# Patient Record
Sex: FEMALE | URBAN - METROPOLITAN AREA
[De-identification: names, ages, dates, MRNs, and addresses within clinical notes are randomized per-mention and may not be internally consistent; named-entity substitution may affect disease eponyms.]

---

## 2017-12-14 ENCOUNTER — INPATIENT (INPATIENT)
Facility: HOSPITAL | Age: 77
LOS: 1 days | Discharge: HOME | End: 2017-12-16
Attending: INTERNAL MEDICINE | Admitting: INTERNAL MEDICINE
Payer: MEDICARE

## 2017-12-14 VITALS
DIASTOLIC BLOOD PRESSURE: 92 MMHG | OXYGEN SATURATION: 99 % | RESPIRATION RATE: 18 BRPM | HEART RATE: 93 BPM | TEMPERATURE: 98 F | SYSTOLIC BLOOD PRESSURE: 187 MMHG

## 2017-12-14 DIAGNOSIS — J45.909 UNSPECIFIED ASTHMA, UNCOMPLICATED: ICD-10-CM

## 2017-12-14 LAB
ALBUMIN SERPL ELPH-MCNC: 4 G/DL — SIGNIFICANT CHANGE UP (ref 3.3–5)
ALP SERPL-CCNC: 81 U/L — SIGNIFICANT CHANGE UP (ref 40–120)
ALT FLD-CCNC: 10 U/L — SIGNIFICANT CHANGE UP (ref 4–33)
AST SERPL-CCNC: 13 U/L — SIGNIFICANT CHANGE UP (ref 4–32)
B PERT DNA SPEC QL NAA+PROBE: SIGNIFICANT CHANGE UP
BASE EXCESS BLDV CALC-SCNC: 4.5 MMOL/L — SIGNIFICANT CHANGE UP
BASOPHILS # BLD AUTO: 0.02 K/UL — SIGNIFICANT CHANGE UP (ref 0–0.2)
BASOPHILS NFR BLD AUTO: 0.2 % — SIGNIFICANT CHANGE UP (ref 0–2)
BILIRUB SERPL-MCNC: 0.6 MG/DL — SIGNIFICANT CHANGE UP (ref 0.2–1.2)
BLOOD GAS VENOUS - CREATININE: 0.63 MG/DL — SIGNIFICANT CHANGE UP (ref 0.5–1.3)
BUN SERPL-MCNC: 12 MG/DL — SIGNIFICANT CHANGE UP (ref 7–23)
C PNEUM DNA SPEC QL NAA+PROBE: NOT DETECTED — SIGNIFICANT CHANGE UP
CALCIUM SERPL-MCNC: 9.8 MG/DL — SIGNIFICANT CHANGE UP (ref 8.4–10.5)
CHLORIDE BLDV-SCNC: 101 MMOL/L — SIGNIFICANT CHANGE UP (ref 96–108)
CHLORIDE SERPL-SCNC: 99 MMOL/L — SIGNIFICANT CHANGE UP (ref 98–107)
CO2 SERPL-SCNC: 28 MMOL/L — SIGNIFICANT CHANGE UP (ref 22–31)
CREAT SERPL-MCNC: 0.75 MG/DL — SIGNIFICANT CHANGE UP (ref 0.5–1.3)
EOSINOPHIL # BLD AUTO: 0.03 K/UL — SIGNIFICANT CHANGE UP (ref 0–0.5)
EOSINOPHIL NFR BLD AUTO: 0.4 % — SIGNIFICANT CHANGE UP (ref 0–6)
FLUAV H1 2009 PAND RNA SPEC QL NAA+PROBE: NOT DETECTED — SIGNIFICANT CHANGE UP
FLUAV H1 RNA SPEC QL NAA+PROBE: NOT DETECTED — SIGNIFICANT CHANGE UP
FLUAV H3 RNA SPEC QL NAA+PROBE: NOT DETECTED — SIGNIFICANT CHANGE UP
FLUAV SUBTYP SPEC NAA+PROBE: SIGNIFICANT CHANGE UP
FLUBV RNA SPEC QL NAA+PROBE: NOT DETECTED — SIGNIFICANT CHANGE UP
GAS PNL BLDV: 137 MMOL/L — SIGNIFICANT CHANGE UP (ref 136–146)
GLUCOSE BLDV-MCNC: 113 — HIGH (ref 70–99)
GLUCOSE SERPL-MCNC: 114 MG/DL — HIGH (ref 70–99)
HADV DNA SPEC QL NAA+PROBE: NOT DETECTED — SIGNIFICANT CHANGE UP
HCO3 BLDV-SCNC: 26 MMOL/L — SIGNIFICANT CHANGE UP (ref 20–27)
HCOV 229E RNA SPEC QL NAA+PROBE: NOT DETECTED — SIGNIFICANT CHANGE UP
HCOV HKU1 RNA SPEC QL NAA+PROBE: NOT DETECTED — SIGNIFICANT CHANGE UP
HCOV NL63 RNA SPEC QL NAA+PROBE: NOT DETECTED — SIGNIFICANT CHANGE UP
HCOV OC43 RNA SPEC QL NAA+PROBE: NOT DETECTED — SIGNIFICANT CHANGE UP
HCT VFR BLD CALC: 42.5 % — SIGNIFICANT CHANGE UP (ref 34.5–45)
HCT VFR BLDV CALC: 42.5 % — SIGNIFICANT CHANGE UP (ref 34.5–45)
HGB BLD-MCNC: 13.4 G/DL — SIGNIFICANT CHANGE UP (ref 11.5–15.5)
HGB BLDV-MCNC: 13.8 G/DL — SIGNIFICANT CHANGE UP (ref 11.5–15.5)
HMPV RNA SPEC QL NAA+PROBE: NOT DETECTED — SIGNIFICANT CHANGE UP
HPIV1 RNA SPEC QL NAA+PROBE: NOT DETECTED — SIGNIFICANT CHANGE UP
HPIV2 RNA SPEC QL NAA+PROBE: NOT DETECTED — SIGNIFICANT CHANGE UP
HPIV3 RNA SPEC QL NAA+PROBE: NOT DETECTED — SIGNIFICANT CHANGE UP
HPIV4 RNA SPEC QL NAA+PROBE: NOT DETECTED — SIGNIFICANT CHANGE UP
IMM GRANULOCYTES # BLD AUTO: 0.03 # — SIGNIFICANT CHANGE UP
IMM GRANULOCYTES NFR BLD AUTO: 0.4 % — SIGNIFICANT CHANGE UP (ref 0–1.5)
LACTATE BLDV-MCNC: 1.7 MMOL/L — SIGNIFICANT CHANGE UP (ref 0.5–2)
LYMPHOCYTES # BLD AUTO: 1.78 K/UL — SIGNIFICANT CHANGE UP (ref 1–3.3)
LYMPHOCYTES # BLD AUTO: 21.2 % — SIGNIFICANT CHANGE UP (ref 13–44)
M PNEUMO DNA SPEC QL NAA+PROBE: NOT DETECTED — SIGNIFICANT CHANGE UP
MCHC RBC-ENTMCNC: 24.9 PG — LOW (ref 27–34)
MCHC RBC-ENTMCNC: 31.5 % — LOW (ref 32–36)
MCV RBC AUTO: 78.8 FL — LOW (ref 80–100)
MONOCYTES # BLD AUTO: 0.46 K/UL — SIGNIFICANT CHANGE UP (ref 0–0.9)
MONOCYTES NFR BLD AUTO: 5.5 % — SIGNIFICANT CHANGE UP (ref 2–14)
NEUTROPHILS # BLD AUTO: 6.07 K/UL — SIGNIFICANT CHANGE UP (ref 1.8–7.4)
NEUTROPHILS NFR BLD AUTO: 72.3 % — SIGNIFICANT CHANGE UP (ref 43–77)
NRBC # FLD: 0 — SIGNIFICANT CHANGE UP
PCO2 BLDV: 61 MMHG — HIGH (ref 41–51)
PH BLDV: 7.32 PH — SIGNIFICANT CHANGE UP (ref 7.32–7.43)
PLATELET # BLD AUTO: 238 K/UL — SIGNIFICANT CHANGE UP (ref 150–400)
PMV BLD: 10 FL — SIGNIFICANT CHANGE UP (ref 7–13)
PO2 BLDV: 36 MMHG — SIGNIFICANT CHANGE UP (ref 35–40)
POTASSIUM BLDV-SCNC: 4.1 MMOL/L — SIGNIFICANT CHANGE UP (ref 3.4–4.5)
POTASSIUM SERPL-MCNC: 4.1 MMOL/L — SIGNIFICANT CHANGE UP (ref 3.5–5.3)
POTASSIUM SERPL-SCNC: 4.1 MMOL/L — SIGNIFICANT CHANGE UP (ref 3.5–5.3)
PROT SERPL-MCNC: 7.1 G/DL — SIGNIFICANT CHANGE UP (ref 6–8.3)
RBC # BLD: 5.39 M/UL — HIGH (ref 3.8–5.2)
RBC # FLD: 15.3 % — HIGH (ref 10.3–14.5)
RSV RNA SPEC QL NAA+PROBE: NOT DETECTED — SIGNIFICANT CHANGE UP
RV+EV RNA SPEC QL NAA+PROBE: NOT DETECTED — SIGNIFICANT CHANGE UP
SAO2 % BLDV: 58.9 % — LOW (ref 60–85)
SODIUM SERPL-SCNC: 139 MMOL/L — SIGNIFICANT CHANGE UP (ref 135–145)
WBC # BLD: 8.39 K/UL — SIGNIFICANT CHANGE UP (ref 3.8–10.5)
WBC # FLD AUTO: 8.39 K/UL — SIGNIFICANT CHANGE UP (ref 3.8–10.5)

## 2017-12-14 PROCEDURE — 99223 1ST HOSP IP/OBS HIGH 75: CPT

## 2017-12-14 PROCEDURE — 71020: CPT | Mod: 26

## 2017-12-14 PROCEDURE — 73130 X-RAY EXAM OF HAND: CPT | Mod: 26,LT

## 2017-12-14 PROCEDURE — 73090 X-RAY EXAM OF FOREARM: CPT | Mod: 26,LT

## 2017-12-14 PROCEDURE — 73110 X-RAY EXAM OF WRIST: CPT | Mod: 26,LT

## 2017-12-14 RX ORDER — MAGNESIUM SULFATE 500 MG/ML
2 VIAL (ML) INJECTION ONCE
Qty: 0 | Refills: 0 | Status: COMPLETED | OUTPATIENT
Start: 2017-12-14 | End: 2017-12-14

## 2017-12-14 RX ORDER — ALBUTEROL 90 UG/1
1 AEROSOL, METERED ORAL EVERY 4 HOURS
Qty: 0 | Refills: 0 | Status: DISCONTINUED | OUTPATIENT
Start: 2017-12-14 | End: 2017-12-16

## 2017-12-14 RX ORDER — HYDROCORTISONE 20 MG
125 TABLET ORAL ONCE
Qty: 0 | Refills: 0 | Status: DISCONTINUED | OUTPATIENT
Start: 2017-12-14 | End: 2017-12-14

## 2017-12-14 RX ORDER — ONDANSETRON 8 MG/1
4 TABLET, FILM COATED ORAL ONCE
Qty: 0 | Refills: 0 | Status: COMPLETED | OUTPATIENT
Start: 2017-12-14 | End: 2017-12-14

## 2017-12-14 RX ORDER — IPRATROPIUM/ALBUTEROL SULFATE 18-103MCG
3 AEROSOL WITH ADAPTER (GRAM) INHALATION ONCE
Qty: 0 | Refills: 0 | Status: COMPLETED | OUTPATIENT
Start: 2017-12-14 | End: 2017-12-14

## 2017-12-14 RX ORDER — IPRATROPIUM/ALBUTEROL SULFATE 18-103MCG
3 AEROSOL WITH ADAPTER (GRAM) INHALATION EVERY 6 HOURS
Qty: 0 | Refills: 0 | Status: DISCONTINUED | OUTPATIENT
Start: 2017-12-14 | End: 2017-12-16

## 2017-12-14 RX ORDER — TIOTROPIUM BROMIDE 18 UG/1
1 CAPSULE ORAL; RESPIRATORY (INHALATION) DAILY
Qty: 0 | Refills: 0 | Status: DISCONTINUED | OUTPATIENT
Start: 2017-12-14 | End: 2017-12-16

## 2017-12-14 RX ORDER — OXYCODONE AND ACETAMINOPHEN 5; 325 MG/1; MG/1
1 TABLET ORAL ONCE
Qty: 0 | Refills: 0 | Status: DISCONTINUED | OUTPATIENT
Start: 2017-12-14 | End: 2017-12-14

## 2017-12-14 RX ADMIN — Medication 3 MILLILITER(S): at 12:31

## 2017-12-14 RX ADMIN — OXYCODONE AND ACETAMINOPHEN 1 TABLET(S): 5; 325 TABLET ORAL at 12:15

## 2017-12-14 RX ADMIN — Medication 3 MILLILITER(S): at 12:00

## 2017-12-14 RX ADMIN — OXYCODONE AND ACETAMINOPHEN 1 TABLET(S): 5; 325 TABLET ORAL at 10:56

## 2017-12-14 RX ADMIN — ONDANSETRON 4 MILLIGRAM(S): 8 TABLET, FILM COATED ORAL at 10:56

## 2017-12-14 RX ADMIN — Medication 50 GRAM(S): at 14:40

## 2017-12-14 RX ADMIN — Medication 3 MILLILITER(S): at 10:57

## 2017-12-14 RX ADMIN — Medication 125 MILLIGRAM(S): at 10:56

## 2017-12-14 NOTE — ED PROVIDER NOTE - MEDICAL DECISION MAKING DETAILS
L wrist pain s/p FOOSH- likely distal radius +/- distal ulnar fx- will do pain control, xray  Worsening sob/wheezing 2/2 asthma- will do cxr, give duonebs, steroids, re-eval; will give pulm outpt f/u if pt is improved.

## 2017-12-14 NOTE — ED PROVIDER NOTE - ATTENDING CONTRIBUTION TO CARE
ED Attending (Alfredito REYES): I have personally performed a face to face bedside history and physical examination of this patient. I have discussed the history, examination, assessment and plan of management with the Physician Assistant. My findings include: 77 year old right-hand dominant female, PMHx of asthma (h/o ICU stays, +intubations); presents to the ED c/o left wrist pain s/p fall. Patient states she was walking backward yesterday, had a mechanical trip and fall backward. She fell onto outstretched left hand. Since then, the wrist has become progressively more painful, swollen and discolored, and she has been unable to move it prompting her to come to the ED. On exam: in pain, L wrist swollen ecchymotic, tender, limited ROM, neuro-vascular exam of L arm and hand normal, no other injuries noted, able to move fingers and feel light touch normally, lungs decreased air movement throughout, no resp distress. Imp likely fx L wrist, poorly controlled asthma. Plan: analgesia, Xrays, neb treatments, reassess

## 2017-12-14 NOTE — ED PROVIDER NOTE - PROGRESS NOTE DETAILS
Discussed wrist fracture with ortho who reviewed xrays and recommended sugar tongs splint and ortho follow up

## 2017-12-14 NOTE — ED PROVIDER NOTE - UPPER EXTREMITY EXAM, LEFT
JOINT SWELLING/TENDERNESS/LIMITED ROM/REDUCED STRENGTH/L wrist: Decreased ROM of wrist- FROM fingers/elbow. Diffuse ecchymosis over distal wrist. +mild deformity at distal wrist, with swelling. NVI.  Normal elbow/hand exam, no snuffbox TTP./BRUISING/SWELLING/DEFORMITY

## 2017-12-14 NOTE — ED PROVIDER NOTE - PMH
Asthma  Hospitalized January 2013 with active asthma. was intubated in MICU  Diverticulitis of colon without hemorrhage    HTN (hypertension)  was on cozaar & cardizem was stopped by MD as per pt  Low back pain

## 2017-12-14 NOTE — ED PROVIDER NOTE - OBJECTIVE STATEMENT
77 year old right-hand dominant female, PMHx of asthma (h/o ICU stays, +intubations); presents to the ED c/o left wrist pain s/p fall. Patient states she was walking backward yesterday, had a mechanical trip and fall backward. She fell onto outstretched left hand. Since then, the wrist has become progressively more painful, swollen and discolored, and she has been unable to move it prompting her to come to the ED. Of note, patient recently changed insurances and has not seen her pulmonologist in many months. She endorses worsening asthma symptoms of shortness of breath and wheezing especially in the last 1-2 weeks. She notes she has difficulty walking even short distances and needs to use her pump. The pump gives her mild relief of symptoms, but notes worsening SOB at rest. She denies fevers, chills, nausea, vomiting, productive cough, chest pain, numbness, tingling, head trauma, LOC or other complaints.

## 2017-12-14 NOTE — ED ADULT TRIAGE NOTE - CHIEF COMPLAINT QUOTE
Patient fell yesterday and hurt her left hand/wrist. Pt has swelling to site and discoloration. Site is very painful. Pt is also asthmatic and c/o difficulty with her breathing since then.

## 2017-12-15 ENCOUNTER — TRANSCRIPTION ENCOUNTER (OUTPATIENT)
Age: 77
End: 2017-12-15

## 2017-12-15 DIAGNOSIS — J45.51 SEVERE PERSISTENT ASTHMA WITH (ACUTE) EXACERBATION: ICD-10-CM

## 2017-12-15 DIAGNOSIS — Z29.9 ENCOUNTER FOR PROPHYLACTIC MEASURES, UNSPECIFIED: ICD-10-CM

## 2017-12-15 DIAGNOSIS — I10 ESSENTIAL (PRIMARY) HYPERTENSION: ICD-10-CM

## 2017-12-15 DIAGNOSIS — S52.502A UNSPECIFIED FRACTURE OF THE LOWER END OF LEFT RADIUS, INITIAL ENCOUNTER FOR CLOSED FRACTURE: ICD-10-CM

## 2017-12-15 PROCEDURE — 99233 SBSQ HOSP IP/OBS HIGH 50: CPT | Mod: GC

## 2017-12-15 PROCEDURE — 73090 X-RAY EXAM OF FOREARM: CPT | Mod: 26,LT

## 2017-12-15 PROCEDURE — 73110 X-RAY EXAM OF WRIST: CPT | Mod: 26,LT

## 2017-12-15 RX ORDER — ACETAMINOPHEN 500 MG
650 TABLET ORAL EVERY 6 HOURS
Qty: 0 | Refills: 0 | Status: DISCONTINUED | OUTPATIENT
Start: 2017-12-15 | End: 2017-12-16

## 2017-12-15 RX ORDER — ACETAMINOPHEN 500 MG
650 TABLET ORAL EVERY 6 HOURS
Qty: 0 | Refills: 0 | Status: DISCONTINUED | OUTPATIENT
Start: 2017-12-15 | End: 2017-12-15

## 2017-12-15 RX ORDER — BUDESONIDE AND FORMOTEROL FUMARATE DIHYDRATE 160; 4.5 UG/1; UG/1
2 AEROSOL RESPIRATORY (INHALATION)
Qty: 0 | Refills: 0 | Status: DISCONTINUED | OUTPATIENT
Start: 2017-12-15 | End: 2017-12-16

## 2017-12-15 RX ORDER — OXYCODONE AND ACETAMINOPHEN 5; 325 MG/1; MG/1
1 TABLET ORAL EVERY 6 HOURS
Qty: 0 | Refills: 0 | Status: DISCONTINUED | OUTPATIENT
Start: 2017-12-15 | End: 2017-12-15

## 2017-12-15 RX ADMIN — Medication 650 MILLIGRAM(S): at 11:35

## 2017-12-15 RX ADMIN — OXYCODONE AND ACETAMINOPHEN 1 TABLET(S): 5; 325 TABLET ORAL at 19:20

## 2017-12-15 RX ADMIN — Medication 3 MILLILITER(S): at 09:44

## 2017-12-15 RX ADMIN — OXYCODONE AND ACETAMINOPHEN 1 TABLET(S): 5; 325 TABLET ORAL at 18:22

## 2017-12-15 RX ADMIN — Medication 650 MILLIGRAM(S): at 10:41

## 2017-12-15 RX ADMIN — BUDESONIDE AND FORMOTEROL FUMARATE DIHYDRATE 2 PUFF(S): 160; 4.5 AEROSOL RESPIRATORY (INHALATION) at 21:49

## 2017-12-15 RX ADMIN — Medication 40 MILLIGRAM(S): at 05:42

## 2017-12-15 RX ADMIN — BUDESONIDE AND FORMOTEROL FUMARATE DIHYDRATE 2 PUFF(S): 160; 4.5 AEROSOL RESPIRATORY (INHALATION) at 11:21

## 2017-12-15 RX ADMIN — Medication 3 MILLILITER(S): at 16:22

## 2017-12-15 RX ADMIN — Medication 3 MILLILITER(S): at 03:09

## 2017-12-15 NOTE — PROGRESS NOTE ADULT - PROBLEM SELECTOR PLAN 2
- Pt with acute fracture of L distal radius now s/p soft cast (unclear who placed cast currently)  - Will check post reduction xrays, consider ortho eval in AM for assessment and establishment of outpatient follow up  - Percocet prn mod-severe pain  - PT eval  - APRYL CORDOVA for now - Pt with acute fracture of L distal radius now s/p splinting in ED  - Spoke with orthopedic resident who states that Xrays have been reviewed and that patient is stable for outpatient ortho follow up with Dr. Marek Perdomo prn mod-severe pain  - PT eval-- recommend home PT  - APRYL CORDOVA for now

## 2017-12-15 NOTE — H&P ADULT - NSHPPHYSICALEXAM_GEN_ALL_CORE
Vital Signs Last 24 Hrs  T(C): 36.7 (14 Dec 2017 20:53), Max: 36.7 (14 Dec 2017 09:58)  T(F): 98 (14 Dec 2017 20:53), Max: 98.1 (14 Dec 2017 14:00)  HR: 103 (15 Dec 2017 00:06) (90 - 109)  BP: 167/82 (15 Dec 2017 00:06) (158/60 - 187/95)  BP(mean): --  RR: 18 (15 Dec 2017 00:06) (18 - 20)  SpO2: 97% (15 Dec 2017 00:06) (92% - 99%)    GENERAL: No acute distress, well-developed  HEAD:  Atraumatic, Normocephalic  ENT: EOMI, PERRLA, conjunctiva and sclera clear, Neck supple, No JVD, moist mucosa  CHEST/LUNG: Clear to auscultation bilaterally; No wheeze, equal breath sounds bilaterally   BACK: No spinal tenderness  HEART: Regular rate and rhythm; No murmurs, rubs, or gallops  ABDOMEN: Soft, Nontender, Nondistended; Bowel sounds present  EXTREMITIES: L arm in soft cast, sensation intact and cap rill intact in L hand; No clubbing, cyanosis, or edema  PSYCH: Nl behavior, nl affect  NEUROLOGY: AAOx3, non-focal  SKIN: Some b/l ecchymosis on lower legs after patient's fall

## 2017-12-15 NOTE — H&P ADULT - NSHPREVIEWOFSYSTEMS_GEN_ALL_CORE
REVIEW OF SYSTEMS:    CONSTITUTIONAL: No weakness, fevers or chills  EYES/ENT: No visual changes;  No dysphagia  NECK: No pain or stiffness  RESPIRATORY: +SOB, +mild cough productive of white sputum; No nasal congestion, rhinorrhea or sore throat  CARDIOVASCULAR: No chest pain or palpitations; No lower extremity edema  GASTROINTESTINAL: No abdominal or epigastric pain. No nausea, vomiting, or hematemesis; No diarrhea or constipation. No melena or hematochezia.  BACK: No back pain  EXT: L wrist pain (see HPI)  GENITOURINARY: No dysuria, frequency or hematuria  NEUROLOGICAL: No numbness or weakness  SKIN: No itching, burning, rashes, or lesions   All other review of systems is negative unless indicated above.

## 2017-12-15 NOTE — PHYSICAL THERAPY INITIAL EVALUATION ADULT - ACTIVE RANGE OF MOTION EXAMINATION, REHAB EVAL
except left wrist n/a due to fracture/bilateral upper extremity Active ROM was WFL (within functional limits)/bilateral  lower extremity Active ROM was WFL (within functional limits)

## 2017-12-15 NOTE — DISCHARGE NOTE ADULT - MEDICATION SUMMARY - MEDICATIONS TO TAKE
I will START or STAY ON the medications listed below when I get home from the hospital:    predniSONE 20 mg oral tablet  -- 2 tab(s) by mouth once a day  -- Indication: For Severe persistent asthma with acute exacerbation    acetaminophen 325 mg oral tablet  -- 2 tab(s) by mouth every 6 hours, As needed, moderate and Severe Pain (7 - 10)  -- Indication: For Pain    Ventolin HFA 90 mcg/inh inhalation aerosol  -- 2 puff(s) inhaled every 6 hours, As Needed for shortness of breath or wheezing  -- Indication: For Asthma    Atrovent HFA 17 mcg/inh inhalation aerosol  -- 2 puff(s) inhaled every 6 hours, As Needed for shortness of breath or wheezing  -- Indication: For Asthma    Symbicort 160 mcg-4.5 mcg/inh inhalation aerosol  -- 2 puff(s) inhaled 2 times a day  -- Indication: For Asthma

## 2017-12-15 NOTE — H&P ADULT - ASSESSMENT
This is a 77F with history of Asthma and HTN who presents with L hand pain s/p fall found to have a distal radial fracture. Also with asthma exacerbation.

## 2017-12-15 NOTE — H&P ADULT - NSHPSOCIALHISTORY_GEN_ALL_CORE
Social History:    Marital Status:  (  )    (  ) Single    (  )    ( X )   Occupation: Retired  Lives with: ( X ) alone  (  ) children   (  ) spouse   (  ) parents  (  ) other    Substance Use (street drugs): ( X ) never used  (  ) other:  Tobacco Usage:  ( X ) never smoked   (  ) former smoker   (  ) current smoker  (  ) pack year  (  ) last cigarette date  Alcohol Usage: Denies current use

## 2017-12-15 NOTE — DISCHARGE NOTE ADULT - CARE PROVIDER_API CALL
Saad Jara (MD), Orthopaedic Surgery  [address] [place] [address]  Phone: [phone]  Fax: [phone] Saad Jara), Orthopaedic Surgery  [address] [address]  Phone: [phone]  Fax: [phone]    Genaro Bustamante), Medicine  [address] [place] [address]  Phone: [phone]  Fax: [phone] Saad Jara), Orthopaedic Surgery  [address] [address]  Phone: [phone]  Fax: [phone]    Genaro Bustamante), Medicine  [address] [place] [address]  Phone: [phone]  Fax: [phone]

## 2017-12-15 NOTE — PROGRESS NOTE ADULT - SUBJECTIVE AND OBJECTIVE BOX
CHIEF COMPLAINT:    Interval Events:    REVIEW OF SYSTEMS:  Constitutional:   Eyes:  ENT:  CV:  Resp:  GI:  :  MSK:  Integumentary:  Neurological:  Psychiatric:  Endocrine:  Hematologic/Lymphatic:  Allergic/Immunologic:  [ ] All other systems negative  [ ] Unable to assess ROS because ________    OBJECTIVE:  ICU Vital Signs Last 24 Hrs  T(C): 36.7 (15 Dec 2017 05:35), Max: 36.7 (14 Dec 2017 09:58)  T(F): 98.1 (15 Dec 2017 05:35), Max: 98.1 (14 Dec 2017 14:00)  HR: 97 (15 Dec 2017 05:35) (90 - 109)  BP: 150/85 (15 Dec 2017 05:35) (150/85 - 187/95)  BP(mean): --  ABP: --  ABP(mean): --  RR: 20 (15 Dec 2017 05:35) (18 - 20)  SpO2: 95% (15 Dec 2017 05:35) (92% - 99%)        CAPILLARY BLOOD GLUCOSE            HOSPITAL MEDICATIONS:  MEDICATIONS  (STANDING):  ALBUTerol    90 MICROgram(s) HFA Inhaler 1 Puff(s) Inhalation every 4 hours  ALBUTerol/ipratropium for Nebulization 3 milliLiter(s) Nebulizer every 6 hours  buDESOnide 160 MICROgram(s)/formoterol 4.5 MICROgram(s) Inhaler 2 Puff(s) Inhalation two times a day  predniSONE   Tablet 40 milliGRAM(s) Oral daily  tiotropium 18 MICROgram(s) Capsule 1 Capsule(s) Inhalation daily    MEDICATIONS  (PRN):  oxyCODONE    5 mG/acetaminophen 325 mG 1 Tablet(s) Oral every 6 hours PRN Moderate to severe pain      LABS:                        13.4   8.39  )-----------( 238      ( 14 Dec 2017 11:45 )             42.5     12-14    139  |  99  |  12  ----------------------------<  114<H>  4.1   |  28  |  0.75    Ca    9.8      14 Dec 2017 11:45    TPro  7.1  /  Alb  4.0  /  TBili  0.6  /  DBili  x   /  AST  13  /  ALT  10  /  AlkPhos  81  12-14          Venous Blood Gas:  12-14 @ 11:45  7.32/61/36/26/58.9  VBG Lactate: 1.7      MICROBIOLOGY:     RADIOLOGY:  [ ] Reviewed and interpreted by me    PULMONARY FUNCTION TESTS:    EKG: CHIEF COMPLAINT: no complaints    Interval Events: admitted to RCU overnight    REVIEW OF SYSTEMS:  CV: denies chest pain  Resp: denies SOB  MSK: denies pain to left arm after ED splinting  [x] All other systems negative      OBJECTIVE:  ICU Vital Signs Last 24 Hrs  T(C): 36.7 (15 Dec 2017 05:35), Max: 36.7 (14 Dec 2017 09:58)  T(F): 98.1 (15 Dec 2017 05:35), Max: 98.1 (14 Dec 2017 14:00)  HR: 97 (15 Dec 2017 05:35) (90 - 109)  BP: 150/85 (15 Dec 2017 05:35) (150/85 - 187/95)  RR: 20 (15 Dec 2017 05:35) (18 - 20)  SpO2: 95% (15 Dec 2017 05:35) (92% - 99%)        HOSPITAL MEDICATIONS:  MEDICATIONS  (STANDING):  ALBUTerol    90 MICROgram(s) HFA Inhaler 1 Puff(s) Inhalation every 4 hours  ALBUTerol/ipratropium for Nebulization 3 milliLiter(s) Nebulizer every 6 hours  buDESOnide 160 MICROgram(s)/formoterol 4.5 MICROgram(s) Inhaler 2 Puff(s) Inhalation two times a day  predniSONE   Tablet 40 milliGRAM(s) Oral daily  tiotropium 18 MICROgram(s) Capsule 1 Capsule(s) Inhalation daily    MEDICATIONS  (PRN):  oxyCODONE    5 mG/acetaminophen 325 mG 1 Tablet(s) Oral every 6 hours PRN Moderate to severe pain      LABS:                        13.4   8.39  )-----------( 238      ( 14 Dec 2017 11:45 )             42.5     12-14    139  |  99  |  12  ----------------------------<  114<H>  4.1   |  28  |  0.75                          Ca    9.8      14 Dec 2017 11:45    TPro  7.1  /  Alb  4.0  /  TBili  0.6  /  DBili  x   /  AST  13  /  ALT  10  /  AlkPhos  81  12-14          Venous Blood Gas:  12-14 @ 11:45  7.32/61/36/26/58.9  VBG Lactate: 1.7        RADIOLOGY: CXR clear lungs

## 2017-12-15 NOTE — DISCHARGE NOTE ADULT - PLAN OF CARE
Fracture will heal in proper alignment Leave splint in place, Avoid getting wet  Do not bear weight on Left arm  Follow up with Dr. Jara, orthopedist within one week. call to make an appointment at the number listed below optimal respiratory function Continue to use your inhalers as instructed  Follow up with your medical doctor within 1 week. Call to make an appointment Leave splint in place, Avoid getting wet  Do not bear weight on Left arm. Notify the orthopedist immediately if the fingers on your affected arm become cold, blue or are unable to move  Follow up with Dr. Jara, orthopedist within one week. call to make an appointment at the number listed below Blood pressure < 140/90 Continue low salt, low fat diet and follow up with Dr. Bustamante for regular blood pressure check ups Continue to use your inhalers as instructed  Follow up with your medical doctor within 1 week. Call to make an appointment. You can see one of the pulmonologists at [address] [place] [address], if you wish. Call to arrange an appointment  with any pulmonologist that is available

## 2017-12-15 NOTE — H&P ADULT - PROBLEM SELECTOR PROBLEM 1
Closed fracture of distal end of left radius, unspecified fracture morphology, initial encounter Severe persistent asthma with acute exacerbation

## 2017-12-15 NOTE — H&P ADULT - PROBLEM SELECTOR PLAN 3
- Pt currently not on medications, noted to have elevated BPs in ED, currently very reluctant to start medications  - continue to observe BPs overnight, if still elevated then restart discussion with patient about medications for BP control

## 2017-12-15 NOTE — DISCHARGE NOTE ADULT - HOSPITAL COURSE
77F with history of Asthma and HTN who presented with L hand pain s/p fall. Found to have a left distal radial fracture. Also diagnosed with asthma exacerbation.     Left distal radial fracture-- left arm splinted by ED. Xrays reviewed by ortho. Neurovascular status intact. To f/u with ortho, Dr. Jara upon discharge    Asthma exacerbation-- In ED received solumedrol 125, mag sulfate, and duonebs with improvement. Continued on steroids and bronchodilators, symptoms resolved. 77F with history of Asthma and HTN who presented with L hand pain s/p fall. Found to have a left distal radial fracture. Also diagnosed with asthma exacerbation.     Left distal radial fracture-- left arm splinted by ED. Xrays reviewed by ortho. Neurovascular status intact. To f/u with ortho, Dr. Jara upon discharge    Asthma exacerbation-- In ED received solumedrol 125, mag sulfate, and duonebs with improvement. Continued on steroids and bronchodilators, symptoms resolved.     Evaluated by PT, home PT recommended. Discharged in stable condition with home care arranged. 77F with history of Asthma and HTN who presented with L hand pain s/p fall. Found to have a left distal radial fracture. Also diagnosed with asthma exacerbation.     Left distal radial fracture-- left arm splinted by ED. Xrays reviewed by ortho. Neurovascular status intact. To f/u with ortho, Dr. Jara upon discharge    Asthma exacerbation-- In ED received solumedrol 125, mag sulfate, and duonebs with improvement. Continued on steroids and bronchodilators, symptoms resolved.     Evaluated by PT, home PT recommended. Patient declined home care.

## 2017-12-15 NOTE — DISCHARGE NOTE ADULT - CARE PLAN
Principal Discharge DX:	Asthma  Secondary Diagnosis:	Distal radius fracture  Goal:	Fracture will heal in proper alignment  Instructions for follow-up, activity and diet:	Leave splint in place, Avoid getting wet  Do not bear weight on Left arm  Follow up with Dr. Jara, orthopedist within one week. call to make an appointment at the number listed below  Secondary Diagnosis:	Essential hypertension Principal Discharge DX:	Asthma  Goal:	optimal respiratory function  Instructions for follow-up, activity and diet:	Continue to use your inhalers as instructed  Follow up with your medical doctor within 1 week. Call to make an appointment  Secondary Diagnosis:	Distal radius fracture  Goal:	Fracture will heal in proper alignment  Instructions for follow-up, activity and diet:	Leave splint in place, Avoid getting wet  Do not bear weight on Left arm. Notify the orthopedist immediately if the fingers on your affected arm become cold, blue or are unable to move  Follow up with Dr. Jara, orthopedist within one week. call to make an appointment at the number listed below  Secondary Diagnosis:	Essential hypertension  Goal:	Blood pressure < 140/90  Instructions for follow-up, activity and diet:	Continue low salt, low fat diet and follow up with Dr. Bustamante for regular blood pressure check ups Principal Discharge DX:	Asthma  Goal:	optimal respiratory function  Instructions for follow-up, activity and diet:	Continue to use your inhalers as instructed  Follow up with your medical doctor within 1 week. Call to make an appointment. You can see one of the pulmonologists at [address] [place] [address], if you wish. Call to arrange an appointment  with any pulmonologist that is available  Secondary Diagnosis:	Distal radius fracture  Goal:	Fracture will heal in proper alignment  Instructions for follow-up, activity and diet:	Leave splint in place, Avoid getting wet  Do not bear weight on Left arm. Notify the orthopedist immediately if the fingers on your affected arm become cold, blue or are unable to move  Follow up with Dr. Jara, orthopedist within one week. call to make an appointment at the number listed below  Secondary Diagnosis:	Essential hypertension  Goal:	Blood pressure < 140/90  Instructions for follow-up, activity and diet:	Continue low salt, low fat diet and follow up with Dr. Bustamante for regular blood pressure check ups

## 2017-12-15 NOTE — PROGRESS NOTE ADULT - PROBLEM SELECTOR PLAN 1
- Pt with complaints of daily and nightly asthma symptoms and frequent rescue inhaler use  - Will therefore treat patient with prednisone 40mg daily, duonebs q6h, and symbicort BID and monitor her respiratory status  - O2 via NC prn  - f/u Pulm in AM -Prednisone 40mg daily, duonebs q6h, and symbicort BID and monitor her respiratory status  -improved  - O2 via NC prn

## 2017-12-15 NOTE — PHYSICAL THERAPY INITIAL EVALUATION ADULT - PERTINENT HX OF CURRENT PROBLEM, REHAB EVAL
pt is 78 y/o female admitted with shortness of breath, left wrist pain and swelling, left distal radius fracture.

## 2017-12-15 NOTE — H&P ADULT - HISTORY OF PRESENT ILLNESS
This is a 77F with history of asthma (hx of prior intubation) and HTN (not on meds) who presents to the hospital with complaints of L hand pain and shortness of breath. 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 Said that she was walking when she tripped and fell onto her L side and braced herself with her L hand. She had immediate pain in her L hand but did not get it evaluated at that time. She tried taking some NSAIDs and sleeping through the night but the pain continued and in fact worsened. When she woke up today she saw that her L hand was swollen and that she could not move it around without difficulty. She therefore came to the hospital for evaluation.     Also said that she has been having shortness of breath for the past few months, said that her  passed away in February and had to change insurances as a result. She has not seen her pulmonologist since then and therefore has not been evaluated for her shortness of breath. Said that the SOB occurs daily, improves with her rescue inhalers but returns a few hours later, has to use her rescue inhalers 3-4 times daily. She also admits to daily night-time awakenings due to SOB. Said that she has noted a decrease in her exercise tolerance and can now only walk 1-1.5 blocks at a time before stopping due to SOB. Has a mild cough associated with her SOB that is productive of some white sputum, denies any nasal congestion/sore throat or f/c/d. No other complaints.    With regards to her HTN, patient states that she was recently told she had elevated BP and would need to start medications but she has held off on starting medications due to her reluctance to take daily pills.     In the ED, her vitals were T 98, P 93, /92, R 18, O2 sat 99% RA. Her labs did not show any significant abnormalities. She had a CXR that did not show any consolidations. Her L hand and wrist xrays showed her to have a distal radius fracture and the patient was placed in a soft cast. She was given duonebs x3, solumedrol 125mg IVP x1, MgSO4 2g IVPB x1, zofran 4mg IVP x1, and Percocet 5/325 PO x1.

## 2017-12-15 NOTE — H&P ADULT - PROBLEM SELECTOR PROBLEM 2
Severe persistent asthma with acute exacerbation Closed fracture of distal end of left radius, unspecified fracture morphology, initial encounter

## 2017-12-15 NOTE — H&P ADULT - PROBLEM SELECTOR PLAN 2
- Pt with complaints of daily and nightly asthma symptoms and frequent rescue inhaler use  - Will therefore treat patient with prednisone 40mg daily, duonebs q6h, and symbicort BID and monitor her respiratory status  - O2 via NC prn  - f/u Pulm in AM - Pt with acute fracture of L distal radius now s/p soft cast (unclear who placed cast currently)  - Will check post reduction xrays, consider ortho eval in AM for assessment and establishment of outpatient follow up  - Percocet prn mod-severe pain  - PT eval  - APRYL CORDOVA for now

## 2017-12-15 NOTE — DISCHARGE NOTE ADULT - CARE PROVIDERS DIRECT ADDRESSES
[email or web address]

## 2017-12-15 NOTE — H&P ADULT - PROBLEM SELECTOR PLAN 1
- Pt with acute fracture of L distal radius now s/p soft cast (unclear who placed cast currently)  - Will check post reduction xrays, consider ortho eval in AM for assessment and establishment of outpatient follow up  - Percocet prn mod-severe pain  - PT eval  - APRYL CORDOVA for now - Pt with complaints of daily and nightly asthma symptoms and frequent rescue inhaler use  - Will therefore treat patient with prednisone 40mg daily, duonebs q6h, and symbicort BID and monitor her respiratory status  - O2 via NC prn  - f/u Pulm in AM

## 2017-12-15 NOTE — PROGRESS NOTE ADULT - PROBLEM SELECTOR PLAN 3
- Pt currently not on medications, noted to have elevated BPs in ED, currently very reluctant to start medications  - continue to observe BPs overnight, if still elevated then restart discussion with patient about medications for BP control - Pt currently not on medications, BP stable now  - outpt f/u with PMD  - low salt, low fat diet

## 2017-12-15 NOTE — H&P ADULT - NSHPLABSRESULTS_GEN_ALL_CORE
LABS and ADDITIONAL STUDIES:                        13.4   8.39  )-----------( 238      ( 14 Dec 2017 11:45 )             42.5     12-14    139  |  99  |  12  ----------------------------<  114<H>  4.1   |  28  |  0.75    Ca    9.8      14 Dec 2017 11:45    TPro  7.1  /  Alb  4.0  /  TBili  0.6  /  DBili  x   /  AST  13  /  ALT  10  /  AlkPhos  81  12-14    LIVER FUNCTIONS - ( 14 Dec 2017 11:45 )  Alb: 4.0 g/dL / Pro: 7.1 g/dL / ALK PHOS: 81 u/L / ALT: 10 u/L / AST: 13 u/L / GGT: x           RVP - neg    Lactate, VBG - 1.7    < from: Xray Chest 2 Views PA/Lat (12.14.17 @ 12:17) >  IMPRESSION:  Clear lungs.  < end of copied text >    < from: Xray Forearm, Left (12.14.17 @ 12:14) >  IMPRESSION:  Acute impacted and slightly dorsally tilted distal radial metaphyseal fracture. Associated surrounding soft tissue swelling. No intra-articular involvement.     No dislocations or additional fractures in the remaining imaged anatomic regions.    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 Preserved elbow and remaining hand and wrist joint spaces.     No evidence for elbow joint effusion.    Generalized osteopenia otherwise no discrete lytic or blastic lesions.  < end of copied text >

## 2017-12-15 NOTE — PROVIDER CONTACT NOTE (OTHER) - ACTION/TREATMENT ORDERED:
Patient refuse treatment and patient is asymptomatic, also patient would not be treated right now for the acute BP change and will be followed up in the morning.

## 2017-12-16 VITALS — OXYGEN SATURATION: 98 %

## 2017-12-16 PROCEDURE — 99232 SBSQ HOSP IP/OBS MODERATE 35: CPT

## 2017-12-16 RX ORDER — BUDESONIDE AND FORMOTEROL FUMARATE DIHYDRATE 160; 4.5 UG/1; UG/1
2 AEROSOL RESPIRATORY (INHALATION)
Qty: 0 | Refills: 0 | COMMUNITY

## 2017-12-16 RX ORDER — ALBUTEROL 90 UG/1
2 AEROSOL, METERED ORAL
Qty: 0 | Refills: 0 | COMMUNITY

## 2017-12-16 RX ORDER — IPRATROPIUM BROMIDE 0.2 MG/ML
2 SOLUTION, NON-ORAL INHALATION
Qty: 1 | Refills: 0 | OUTPATIENT
Start: 2017-12-16

## 2017-12-16 RX ORDER — IPRATROPIUM BROMIDE 0.2 MG/ML
2 SOLUTION, NON-ORAL INHALATION
Qty: 0 | Refills: 0 | COMMUNITY

## 2017-12-16 RX ORDER — BUDESONIDE AND FORMOTEROL FUMARATE DIHYDRATE 160; 4.5 UG/1; UG/1
2 AEROSOL RESPIRATORY (INHALATION)
Qty: 1 | Refills: 0 | OUTPATIENT
Start: 2017-12-16

## 2017-12-16 RX ORDER — ACETAMINOPHEN 500 MG
2 TABLET ORAL
Qty: 0 | Refills: 0 | COMMUNITY
Start: 2017-12-16

## 2017-12-16 RX ORDER — ALBUTEROL 90 UG/1
2 AEROSOL, METERED ORAL
Qty: 1 | Refills: 0 | OUTPATIENT
Start: 2017-12-16

## 2017-12-16 RX ADMIN — BUDESONIDE AND FORMOTEROL FUMARATE DIHYDRATE 2 PUFF(S): 160; 4.5 AEROSOL RESPIRATORY (INHALATION) at 10:02

## 2017-12-16 RX ADMIN — Medication 3 MILLILITER(S): at 16:22

## 2017-12-16 RX ADMIN — Medication 40 MILLIGRAM(S): at 06:12

## 2017-12-16 RX ADMIN — Medication 3 MILLILITER(S): at 10:02

## 2017-12-16 RX ADMIN — Medication 3 MILLILITER(S): at 04:15

## 2017-12-16 RX ADMIN — Medication 650 MILLIGRAM(S): at 13:47

## 2017-12-16 RX ADMIN — Medication 650 MILLIGRAM(S): at 14:40

## 2017-12-16 NOTE — PROGRESS NOTE ADULT - EXTREMITIES COMMENTS
left arm in rigid splint with ace wrap, fingers warm and mobile brisk cap refill
Left arm in splint with ACE wrap. Fingers warm and mobile with brisk cap refill

## 2017-12-16 NOTE — PROGRESS NOTE ADULT - PROBLEM SELECTOR PLAN 1
-Prednisone 40mg daily, duonebs q6h, and symbicort BID and monitor her respiratory status  -improved  - O2 via NC prn -Prednisone 40mg daily, duonebs q6h, and symbicort BID   -improved  - oxygenation stable on room air  - stable for discharge

## 2017-12-16 NOTE — PROGRESS NOTE ADULT - SUBJECTIVE AND OBJECTIVE BOX
CHIEF COMPLAINT:    Interval Events:    REVIEW OF SYSTEMS:  Constitutional:   Eyes:  ENT:  CV:  Resp:  GI:  :  MSK:  Integumentary:  Neurological:  Psychiatric:  Endocrine:  Hematologic/Lymphatic:  Allergic/Immunologic:  [ ] All other systems negative  [ ] Unable to assess ROS because ________    OBJECTIVE:  ICU Vital Signs Last 24 Hrs  T(C): 36.4 (16 Dec 2017 06:10), Max: 36.6 (15 Dec 2017 22:36)  T(F): 97.6 (16 Dec 2017 06:10), Max: 97.9 (15 Dec 2017 22:36)  HR: 75 (16 Dec 2017 06:10) (75 - 109)  BP: 138/68 (16 Dec 2017 06:10) (138/68 - 172/94)  BP(mean): --  ABP: --  ABP(mean): --  RR: 17 (16 Dec 2017 06:10) (17 - 18)  SpO2: 95% (16 Dec 2017 06:10) (95% - 97%)        CAPILLARY BLOOD GLUCOSE            HOSPITAL MEDICATIONS:  MEDICATIONS  (STANDING):  ALBUTerol    90 MICROgram(s) HFA Inhaler 1 Puff(s) Inhalation every 4 hours  ALBUTerol/ipratropium for Nebulization 3 milliLiter(s) Nebulizer every 6 hours  buDESOnide 160 MICROgram(s)/formoterol 4.5 MICROgram(s) Inhaler 2 Puff(s) Inhalation two times a day  predniSONE   Tablet 40 milliGRAM(s) Oral daily  tiotropium 18 MICROgram(s) Capsule 1 Capsule(s) Inhalation daily    MEDICATIONS  (PRN):  acetaminophen   Tablet. 650 milliGRAM(s) Oral every 6 hours PRN moderate and Severe Pain (7 - 10)      LABS:                        13.4   8.39  )-----------( 238      ( 14 Dec 2017 11:45 )             42.5     12-14    139  |  99  |  12  ----------------------------<  114<H>  4.1   |  28  |  0.75    Ca    9.8      14 Dec 2017 11:45    TPro  7.1  /  Alb  4.0  /  TBili  0.6  /  DBili  x   /  AST  13  /  ALT  10  /  AlkPhos  81  12-14          Venous Blood Gas:  12-14 @ 11:45  7.32/61/36/26/58.9  VBG Lactate: 1.7      MICROBIOLOGY:     RADIOLOGY:  [ ] Reviewed and interpreted by me    PULMONARY FUNCTION TESTS:    EKG: CHIEF COMPLAINT: Patient is a 77y old  Female who presents with a chief complaint of L hand pain, SOB (15 Dec 2017 11:21)      Interval Events: stable overnight    REVIEW OF SYSTEMS:  CV: denies chest pain  Resp: denies SOB  MSK: c/o left arm discomfort due to splint  Neurological: denies numbness or tingling to left fingers  [x] All other systems negative    OBJECTIVE:  ICU Vital Signs Last 24 Hrs  T(C): 36.4 (16 Dec 2017 06:10), Max: 36.6 (15 Dec 2017 22:36)  T(F): 97.6 (16 Dec 2017 06:10), Max: 97.9 (15 Dec 2017 22:36)  HR: 75 (16 Dec 2017 06:10) (75 - 109)  BP: 138/68 (16 Dec 2017 06:10) (138/68 - 172/94)  RR: 17 (16 Dec 2017 06:10) (17 - 18)  SpO2: 95% (16 Dec 2017 06:10) (95% - 97%)      HOSPITAL MEDICATIONS:  MEDICATIONS  (STANDING):  ALBUTerol    90 MICROgram(s) HFA Inhaler 1 Puff(s) Inhalation every 4 hours  ALBUTerol/ipratropium for Nebulization 3 milliLiter(s) Nebulizer every 6 hours  buDESOnide 160 MICROgram(s)/formoterol 4.5 MICROgram(s) Inhaler 2 Puff(s) Inhalation two times a day  predniSONE   Tablet 40 milliGRAM(s) Oral daily  tiotropium 18 MICROgram(s) Capsule 1 Capsule(s) Inhalation daily    MEDICATIONS  (PRN):  acetaminophen   Tablet. 650 milliGRAM(s) Oral every 6 hours PRN moderate and Severe Pain (7 - 10)

## 2018-01-08 ENCOUNTER — APPOINTMENT (OUTPATIENT)
Dept: ORTHOPEDIC SURGERY | Facility: CLINIC | Age: 78
End: 2018-01-08
Payer: MEDICARE

## 2018-01-08 VITALS
DIASTOLIC BLOOD PRESSURE: 95 MMHG | BODY MASS INDEX: 28.12 KG/M2 | HEIGHT: 66 IN | SYSTOLIC BLOOD PRESSURE: 175 MMHG | WEIGHT: 175 LBS | HEART RATE: 96 BPM

## 2018-01-08 PROCEDURE — 25600 CLTX DST RDL FX/EPHYS SEP WO: CPT | Mod: LT

## 2018-01-08 PROCEDURE — 73110 X-RAY EXAM OF WRIST: CPT | Mod: LT

## 2018-01-08 PROCEDURE — 99204 OFFICE O/P NEW MOD 45 MIN: CPT | Mod: 57

## 2018-01-29 ENCOUNTER — APPOINTMENT (OUTPATIENT)
Dept: ORTHOPEDIC SURGERY | Facility: CLINIC | Age: 78
End: 2018-01-29
Payer: MEDICARE

## 2018-01-29 VITALS
HEART RATE: 109 BPM | WEIGHT: 175 LBS | SYSTOLIC BLOOD PRESSURE: 176 MMHG | HEIGHT: 66 IN | DIASTOLIC BLOOD PRESSURE: 94 MMHG | BODY MASS INDEX: 28.12 KG/M2

## 2018-01-29 DIAGNOSIS — S52.532D COLLES' FRACTURE OF LEFT RADIUS, SUBSEQUENT ENCOUNTER FOR CLOSED FRACTURE WITH ROUTINE HEALING: ICD-10-CM

## 2018-01-29 PROCEDURE — 99024 POSTOP FOLLOW-UP VISIT: CPT

## 2018-01-29 PROCEDURE — 73110 X-RAY EXAM OF WRIST: CPT | Mod: LT

## 2018-02-11 ENCOUNTER — INPATIENT (INPATIENT)
Facility: HOSPITAL | Age: 78
LOS: 2 days | Discharge: HOME | End: 2018-02-14
Attending: INTERNAL MEDICINE | Admitting: INTERNAL MEDICINE
Payer: MEDICARE

## 2018-02-11 VITALS
DIASTOLIC BLOOD PRESSURE: 75 MMHG | TEMPERATURE: 101 F | HEART RATE: 125 BPM | OXYGEN SATURATION: 88 % | RESPIRATION RATE: 26 BRPM | SYSTOLIC BLOOD PRESSURE: 171 MMHG

## 2018-02-11 DIAGNOSIS — I10 ESSENTIAL (PRIMARY) HYPERTENSION: ICD-10-CM

## 2018-02-11 DIAGNOSIS — Z29.9 ENCOUNTER FOR PROPHYLACTIC MEASURES, UNSPECIFIED: ICD-10-CM

## 2018-02-11 DIAGNOSIS — J45.901 UNSPECIFIED ASTHMA WITH (ACUTE) EXACERBATION: ICD-10-CM

## 2018-02-11 DIAGNOSIS — J11.1 INFLUENZA DUE TO UNIDENTIFIED INFLUENZA VIRUS WITH OTHER RESPIRATORY MANIFESTATIONS: ICD-10-CM

## 2018-02-11 LAB
ALBUMIN SERPL ELPH-MCNC: 3.9 G/DL — SIGNIFICANT CHANGE UP (ref 3.3–5)
ALP SERPL-CCNC: 61 U/L — SIGNIFICANT CHANGE UP (ref 40–120)
ALT FLD-CCNC: 25 U/L — SIGNIFICANT CHANGE UP (ref 4–33)
AST SERPL-CCNC: 74 U/L — HIGH (ref 4–32)
B PERT DNA SPEC QL NAA+PROBE: SIGNIFICANT CHANGE UP
BASE EXCESS BLDV CALC-SCNC: 4.1 MMOL/L — SIGNIFICANT CHANGE UP
BASOPHILS # BLD AUTO: 0.01 K/UL — SIGNIFICANT CHANGE UP (ref 0–0.2)
BASOPHILS NFR BLD AUTO: 0.1 % — SIGNIFICANT CHANGE UP (ref 0–2)
BILIRUB SERPL-MCNC: 0.2 MG/DL — SIGNIFICANT CHANGE UP (ref 0.2–1.2)
BLOOD GAS VENOUS - CREATININE: 0.68 MG/DL — SIGNIFICANT CHANGE UP (ref 0.5–1.3)
BUN SERPL-MCNC: 16 MG/DL — SIGNIFICANT CHANGE UP (ref 7–23)
C PNEUM DNA SPEC QL NAA+PROBE: NOT DETECTED — SIGNIFICANT CHANGE UP
CALCIUM SERPL-MCNC: 7.5 MG/DL — LOW (ref 8.4–10.5)
CHLORIDE BLDV-SCNC: 102 MMOL/L — SIGNIFICANT CHANGE UP (ref 96–108)
CHLORIDE SERPL-SCNC: 95 MMOL/L — LOW (ref 98–107)
CK MB BLD-MCNC: 1 — SIGNIFICANT CHANGE UP (ref 0–2.5)
CK MB BLD-MCNC: 3.6 NG/ML — SIGNIFICANT CHANGE UP (ref 1–4.7)
CK SERPL-CCNC: 355 U/L — HIGH (ref 25–170)
CO2 SERPL-SCNC: 26 MMOL/L — SIGNIFICANT CHANGE UP (ref 22–31)
CREAT SERPL-MCNC: 0.82 MG/DL — SIGNIFICANT CHANGE UP (ref 0.5–1.3)
EOSINOPHIL # BLD AUTO: 0.02 K/UL — SIGNIFICANT CHANGE UP (ref 0–0.5)
EOSINOPHIL NFR BLD AUTO: 0.2 % — SIGNIFICANT CHANGE UP (ref 0–6)
FLUAV H1 2009 PAND RNA SPEC QL NAA+PROBE: POSITIVE — HIGH
FLUAV H1 RNA SPEC QL NAA+PROBE: NOT DETECTED — SIGNIFICANT CHANGE UP
FLUAV H3 RNA SPEC QL NAA+PROBE: NOT DETECTED — SIGNIFICANT CHANGE UP
FLUBV RNA SPEC QL NAA+PROBE: NOT DETECTED — SIGNIFICANT CHANGE UP
GAS PNL BLDV: 130 MMOL/L — LOW (ref 136–146)
GLUCOSE BLDV-MCNC: 133 — HIGH (ref 70–99)
GLUCOSE SERPL-MCNC: 127 MG/DL — HIGH (ref 70–99)
HADV DNA SPEC QL NAA+PROBE: NOT DETECTED — SIGNIFICANT CHANGE UP
HCO3 BLDV-SCNC: 27 MMOL/L — SIGNIFICANT CHANGE UP (ref 20–27)
HCOV 229E RNA SPEC QL NAA+PROBE: NOT DETECTED — SIGNIFICANT CHANGE UP
HCOV HKU1 RNA SPEC QL NAA+PROBE: NOT DETECTED — SIGNIFICANT CHANGE UP
HCOV NL63 RNA SPEC QL NAA+PROBE: NOT DETECTED — SIGNIFICANT CHANGE UP
HCOV OC43 RNA SPEC QL NAA+PROBE: NOT DETECTED — SIGNIFICANT CHANGE UP
HCT VFR BLD CALC: 43.2 % — SIGNIFICANT CHANGE UP (ref 34.5–45)
HCT VFR BLDV CALC: 42.6 % — SIGNIFICANT CHANGE UP (ref 34.5–45)
HGB BLD-MCNC: 13.6 G/DL — SIGNIFICANT CHANGE UP (ref 11.5–15.5)
HGB BLDV-MCNC: 13.9 G/DL — SIGNIFICANT CHANGE UP (ref 11.5–15.5)
HMPV RNA SPEC QL NAA+PROBE: NOT DETECTED — SIGNIFICANT CHANGE UP
HPIV1 RNA SPEC QL NAA+PROBE: NOT DETECTED — SIGNIFICANT CHANGE UP
HPIV2 RNA SPEC QL NAA+PROBE: NOT DETECTED — SIGNIFICANT CHANGE UP
HPIV3 RNA SPEC QL NAA+PROBE: NOT DETECTED — SIGNIFICANT CHANGE UP
HPIV4 RNA SPEC QL NAA+PROBE: NOT DETECTED — SIGNIFICANT CHANGE UP
IMM GRANULOCYTES # BLD AUTO: 0.03 # — SIGNIFICANT CHANGE UP
IMM GRANULOCYTES NFR BLD AUTO: 0.3 % — SIGNIFICANT CHANGE UP (ref 0–1.5)
LACTATE BLDV-MCNC: 1.9 MMOL/L — SIGNIFICANT CHANGE UP (ref 0.5–2)
LYMPHOCYTES # BLD AUTO: 1.12 K/UL — SIGNIFICANT CHANGE UP (ref 1–3.3)
LYMPHOCYTES # BLD AUTO: 12.8 % — LOW (ref 13–44)
M PNEUMO DNA SPEC QL NAA+PROBE: NOT DETECTED — SIGNIFICANT CHANGE UP
MCHC RBC-ENTMCNC: 24.6 PG — LOW (ref 27–34)
MCHC RBC-ENTMCNC: 31.5 % — LOW (ref 32–36)
MCV RBC AUTO: 78.3 FL — LOW (ref 80–100)
MONOCYTES # BLD AUTO: 0.67 K/UL — SIGNIFICANT CHANGE UP (ref 0–0.9)
MONOCYTES NFR BLD AUTO: 7.7 % — SIGNIFICANT CHANGE UP (ref 2–14)
NEUTROPHILS # BLD AUTO: 6.9 K/UL — SIGNIFICANT CHANGE UP (ref 1.8–7.4)
NEUTROPHILS NFR BLD AUTO: 78.9 % — HIGH (ref 43–77)
NRBC # FLD: 0 — SIGNIFICANT CHANGE UP
PCO2 BLDV: 55 MMHG — HIGH (ref 41–51)
PH BLDV: 7.35 PH — SIGNIFICANT CHANGE UP (ref 7.32–7.43)
PLATELET # BLD AUTO: 186 K/UL — SIGNIFICANT CHANGE UP (ref 150–400)
PMV BLD: 9.9 FL — SIGNIFICANT CHANGE UP (ref 7–13)
PO2 BLDV: 42 MMHG — HIGH (ref 35–40)
POTASSIUM BLDV-SCNC: 4.6 MMOL/L — HIGH (ref 3.4–4.5)
POTASSIUM SERPL-MCNC: SIGNIFICANT CHANGE UP MMOL/L (ref 3.5–5.3)
POTASSIUM SERPL-SCNC: SIGNIFICANT CHANGE UP MMOL/L (ref 3.5–5.3)
PROT SERPL-MCNC: 7.7 G/DL — SIGNIFICANT CHANGE UP (ref 6–8.3)
RBC # BLD: 5.52 M/UL — HIGH (ref 3.8–5.2)
RBC # FLD: 14.5 % — SIGNIFICANT CHANGE UP (ref 10.3–14.5)
RSV RNA SPEC QL NAA+PROBE: NOT DETECTED — SIGNIFICANT CHANGE UP
RV+EV RNA SPEC QL NAA+PROBE: NOT DETECTED — SIGNIFICANT CHANGE UP
SAO2 % BLDV: 74.5 % — SIGNIFICANT CHANGE UP (ref 60–85)
SODIUM SERPL-SCNC: 133 MMOL/L — LOW (ref 135–145)
TROPONIN T SERPL-MCNC: < 0.06 NG/ML — SIGNIFICANT CHANGE UP (ref 0–0.06)
WBC # BLD: 8.75 K/UL — SIGNIFICANT CHANGE UP (ref 3.8–10.5)
WBC # FLD AUTO: 8.75 K/UL — SIGNIFICANT CHANGE UP (ref 3.8–10.5)

## 2018-02-11 PROCEDURE — 99223 1ST HOSP IP/OBS HIGH 75: CPT

## 2018-02-11 PROCEDURE — 71045 X-RAY EXAM CHEST 1 VIEW: CPT | Mod: 26

## 2018-02-11 RX ORDER — IPRATROPIUM/ALBUTEROL SULFATE 18-103MCG
3 AEROSOL WITH ADAPTER (GRAM) INHALATION ONCE
Qty: 0 | Refills: 0 | Status: COMPLETED | OUTPATIENT
Start: 2018-02-11 | End: 2018-02-11

## 2018-02-11 RX ORDER — SODIUM CHLORIDE 9 MG/ML
2000 INJECTION INTRAMUSCULAR; INTRAVENOUS; SUBCUTANEOUS ONCE
Qty: 0 | Refills: 0 | Status: COMPLETED | OUTPATIENT
Start: 2018-02-11 | End: 2018-02-11

## 2018-02-11 RX ORDER — LEVALBUTEROL 1.25 MG/.5ML
0.63 SOLUTION, CONCENTRATE RESPIRATORY (INHALATION) EVERY 6 HOURS
Qty: 0 | Refills: 0 | Status: DISCONTINUED | OUTPATIENT
Start: 2018-02-11 | End: 2018-02-13

## 2018-02-11 RX ORDER — BUDESONIDE AND FORMOTEROL FUMARATE DIHYDRATE 160; 4.5 UG/1; UG/1
2 AEROSOL RESPIRATORY (INHALATION)
Qty: 0 | Refills: 0 | Status: DISCONTINUED | OUTPATIENT
Start: 2018-02-11 | End: 2018-02-14

## 2018-02-11 RX ORDER — ACETAMINOPHEN 500 MG
1000 TABLET ORAL ONCE
Qty: 0 | Refills: 0 | Status: COMPLETED | OUTPATIENT
Start: 2018-02-11 | End: 2018-02-11

## 2018-02-11 RX ORDER — IPRATROPIUM/ALBUTEROL SULFATE 18-103MCG
3 AEROSOL WITH ADAPTER (GRAM) INHALATION EVERY 4 HOURS
Qty: 0 | Refills: 0 | Status: DISCONTINUED | OUTPATIENT
Start: 2018-02-11 | End: 2018-02-11

## 2018-02-11 RX ADMIN — SODIUM CHLORIDE 2000 MILLILITER(S): 9 INJECTION INTRAMUSCULAR; INTRAVENOUS; SUBCUTANEOUS at 14:59

## 2018-02-11 RX ADMIN — Medication 125 MILLIGRAM(S): at 15:22

## 2018-02-11 RX ADMIN — Medication 400 MILLIGRAM(S): at 15:22

## 2018-02-11 RX ADMIN — Medication 3 MILLILITER(S): at 14:59

## 2018-02-11 RX ADMIN — Medication 3 MILLILITER(S): at 19:32

## 2018-02-11 RX ADMIN — Medication 3 MILLILITER(S): at 14:58

## 2018-02-11 RX ADMIN — Medication 75 MILLIGRAM(S): at 22:43

## 2018-02-11 NOTE — ED PROVIDER NOTE - MEDICAL DECISION MAKING DETAILS
pt with asthma exacerbation with likely URI, septic by criteria however likely viral syndrome; neg CXR no obvious focal bacterial sources, will check RVP, fluid bolus, nebs, steroids, admit.

## 2018-02-11 NOTE — H&P ADULT - PROBLEM SELECTOR PLAN 4
IMPROVE VTE Individual Risk Assessment, Score = 1, low risk defer HSQ           RISK                                                          Points  [  ] Previous VTE                                               3  [  ] Thrombophilia                                            2  [  ] Lower limb paresis/paralysis                    2    [  ] Active Cancer (in last 6 months)              2   [  ] Immobilization > 24 hrs                             1  [  ] ICU/CCU stay > 24 hours                            1  [ x ] Age > 60                                                        1                                            Total Score ____1_____

## 2018-02-11 NOTE — H&P ADULT - NSHPPHYSICALEXAM_GEN_ALL_CORE
Vital Signs Last 24 Hrs  T(C): 38.1 (11 Feb 2018 14:33), Max: 38.1 (11 Feb 2018 14:33)  T(F): 100.5 (11 Feb 2018 14:33), Max: 100.5 (11 Feb 2018 14:33)  HR: 99 (11 Feb 2018 17:45) (99 - 125)  BP: 155/75 (11 Feb 2018 17:45) (155/75 - 197/84)  BP(mean): --  RR: 20 (11 Feb 2018 17:45) (20 - 26)  SpO2: 98% (11 Feb 2018 17:45) (88% - 98%)    General: NAD, non-toxic appearing, speaking in full sentences with nasal cannula   HEENT: EOMI, PERRLA, no conjunctival pallor, MMM, no JVD, no thyromegaly, neck supple, trachea midline  CV: S1S2 RRR no MRG  Lungs: BL wheezes   Abdomen: soft NTND +BS   Extremities: No CCE +WWP  Skin/MSK: No rashes, preserved ROM on active & passive movement  Neuro: AAOx3, no focal deficits (5/5 strength all extremities), no sensory deficits

## 2018-02-11 NOTE — ED ADULT NURSE NOTE - OBJECTIVE STATEMENT
pt received to [facility] with c/o sob and wheezing x 3 days. audible wheezes noted, pt unable to speak in full sentences without pausing to catch breath. states her son who is her care taker has been sick. on RA, pt 88%, placed on NC4L. pt on HM, sinus tach. denies cp, n/v/d. IV placed, lab drawn and sent. medications given as per MDs orders. son at bedside. will cont to monitor.

## 2018-02-11 NOTE — H&P ADULT - HISTORY OF PRESENT ILLNESS
77F hx of Asthma, Hypertension presents to [facility] ED c/o 3 days of wheezing, shortness of breath and productive cough with white sputum. Patient says her son has been ill, afflicted with similar symptoms. The dyspnea and wheezing occurred concomitantly and have been fairly consistent despite the use of rescue inhalers around the clock. She then developed productive cough and myalgias with subjective fevers. No travel history. She has had 1 instance of asthma exacerbation that led her to be intubated years ago.     In the ED patient got Solumedrol 125 mg IV x 1, Acetaminophen 1g IV x 1, and DuoNebs x 3 and NS bolus x 2     Patient seen at bedside. She feels much better than before. She still feels wheezy and achy but no longer having subjective fevers and decreased cough.

## 2018-02-11 NOTE — H&P ADULT - NSHPREVIEWOFSYSTEMS_GEN_ALL_CORE
REVIEW OF SYSTEMS:    CONSTITUTIONAL: (+) weakness, fevers, chills, myalgias   EYES/ENT: No visual changes;  No vertigo or throat pain   NECK: No pain or stiffness  RESPIRATORY: (+) cough, wheezing, dyspnea; No hemoptysis  CARDIOVASCULAR: No chest pain or palpitations  GASTROINTESTINAL: No abdominal or epigastric pain. No nausea, vomiting, or hematemesis; No diarrhea or constipation. No melena or hematochezia.  GENITOURINARY: No dysuria, frequency or hematuria  NEUROLOGICAL: No numbness or weakness  SKIN: No itching, burning, rashes, or lesions   All other review of systems is negative unless indicated above.

## 2018-02-11 NOTE — H&P ADULT - PROBLEM SELECTOR PLAN 2
Influenza (+) s/p IVF and tylenol.  - Cw Acetaminophen 650 mg PO q6-8h PRN Fever  - F/u blood cultures and urine cultures  - cw Tamiflu 75 mg PO BID

## 2018-02-11 NOTE — ED ADULT TRIAGE NOTE - CHIEF COMPLAINT QUOTE
hx of asthma was intubated for asthma 3 years ago presents with SOB tachycardia and fever hypoxic on room air. LS wheezing in all fields non productive cough.

## 2018-02-11 NOTE — ED PROVIDER NOTE - ATTENDING CONTRIBUTION TO CARE
Dr. Ott:  I have personally performed a face to face bedside history and physical examination of this patient. I have discussed the history, examination, review of systems, assessment and plan of management with the resident. I have reviewed the electronic medical record and amended it to reflect my history, review of systems, physical exam, assessment and plan.    77F h/o asthma with STEFAN presents with SOB x 3 days.  Febrile in ED.  Tried asthma meds at home but not improving.  Son, who is caretaker, had URI symptoms last week.  Denies fever/chills, cp, n/v/d.    Exam:  - nad  - tachy, regular  - +diffuse wheezing  - abd soft ntnd    A/P  - likely respiratory infection inducing asthma exacerbation  - cbc, cmp, vbg, trop  - ekg  - cxr  - duonebs, steroids

## 2018-02-11 NOTE — H&P ADULT - PROBLEM SELECTOR PLAN 1
c/w Prednisone 50 mg PO qD, taper by 10 mg each day, DuoNebs q4h, and Symbicort BID   - Check Peak Flow  - Wean off oxygen

## 2018-02-11 NOTE — H&P ADULT - NSHPLABSRESULTS_GEN_ALL_CORE
CBC Full  -  ( 11 Feb 2018 14:44 )  WBC Count : 8.75 K/uL  Hemoglobin : 13.6 g/dL  Hematocrit : 43.2 %  Platelet Count - Automated : 186 K/uL  Mean Cell Volume : 78.3 fL  Mean Cell Hemoglobin : 24.6 pg  Mean Cell Hemoglobin Concentration : 31.5 %  Auto Neutrophil # : 6.90 K/uL  Auto Lymphocyte # : 1.12 K/uL  Auto Monocyte # : 0.67 K/uL  Auto Eosinophil # : 0.02 K/uL  Auto Basophil # : 0.01 K/uL  Auto Neutrophil % : 78.9 %  Auto Lymphocyte % : 12.8 %  Auto Monocyte % : 7.7 %  Auto Eosinophil % : 0.2 %  Auto Basophil % : 0.1 %    02-11    133<L>  |  95<L>  |  16  ----------------------------<  127<H>  Test not performed SPECIMEN GROSSLY HEMOLYZED   |  26  |  0.82    Ca    7.5<L>      11 Feb 2018 14:44    TPro  7.7  /  Alb  3.9  /  TBili  0.2  /  DBili  x   /  AST  74<H>  /  ALT  25  /  AlkPhos  61  02-11    Blood Gas Venous Comprehensive (02.11.18 @ 14:50)    Blood Gas Venous - Lactate: 1.9: Please note updated reference range. mmol/L    Blood Gas Venous - Chloride: 102 mmol/L    Blood Gas Venous - Creatinine: 0.68 mg/dL    pH, Venous: 7.35 pH    pCO2, Venous: 55 mmHg    pO2, Venous: 42 mmHg    HCO3, Venous: 27 mmol/L    Base Excess, Venous: 4.1: REFERENCE RANGE = -3 + 2 mmol/L mmol/L    Oxygen Saturation, Venous: 74.5 %    Blood Gas Venous - Sodium: 130 mmol/L    Blood Gas Venous - Potassium: 4.6 mmol/L    Blood Gas Venous - Glucose: 133    Blood Gas Venous - Hemoglobin: 13.9 g/dL    Blood Gas Venous - Hematocrit: 42.6 %    Rapid Respiratory Viral Panel (02.11.18 @ 14:44)    Influenza AH3 (RapRVP): POSITIVE    CXR: no consolidations or effusions

## 2018-02-11 NOTE — ED PROVIDER NOTE - OBJECTIVE STATEMENT
78yo f pmh asthma, HTN, p/w worsening shortness of breath. SOB c/w with previous asthma exacerbations. Progressive over 3 days, using atrovent w/o relief. No known fevers until arrival. Denies CP, bodyaches. Son recently had a URI.    Previous hx of intubations in the past.

## 2018-02-11 NOTE — H&P ADULT - ASSESSMENT
77F hx of Asthma, Hypertension presents to [facility] ED c/o 3 days of wheezing, shortness of breath and productive cough with white sputum. Being admitted for influenza induced asthma exacerbation

## 2018-02-12 LAB
APPEARANCE UR: CLEAR — SIGNIFICANT CHANGE UP
BILIRUB UR-MCNC: NEGATIVE — SIGNIFICANT CHANGE UP
BLOOD UR QL VISUAL: NEGATIVE — SIGNIFICANT CHANGE UP
COLOR SPEC: SIGNIFICANT CHANGE UP
GLUCOSE UR-MCNC: NEGATIVE — SIGNIFICANT CHANGE UP
KETONES UR-MCNC: NEGATIVE — SIGNIFICANT CHANGE UP
LEUKOCYTE ESTERASE UR-ACNC: NEGATIVE — SIGNIFICANT CHANGE UP
NITRITE UR-MCNC: NEGATIVE — SIGNIFICANT CHANGE UP
PH UR: 6.5 — SIGNIFICANT CHANGE UP (ref 4.6–8)
PROT UR-MCNC: 20 MG/DL — SIGNIFICANT CHANGE UP
RBC CASTS # UR COMP ASSIST: SIGNIFICANT CHANGE UP (ref 0–?)
SP GR SPEC: 1.01 — SIGNIFICANT CHANGE UP (ref 1–1.04)
SPECIMEN SOURCE: SIGNIFICANT CHANGE UP
SPECIMEN SOURCE: SIGNIFICANT CHANGE UP
SQUAMOUS # UR AUTO: SIGNIFICANT CHANGE UP
UROBILINOGEN FLD QL: NORMAL MG/DL — SIGNIFICANT CHANGE UP
WBC UR QL: SIGNIFICANT CHANGE UP (ref 0–?)

## 2018-02-12 PROCEDURE — 99232 SBSQ HOSP IP/OBS MODERATE 35: CPT

## 2018-02-12 RX ADMIN — Medication 100 MILLIGRAM(S): at 18:36

## 2018-02-12 RX ADMIN — LEVALBUTEROL 0.63 MILLIGRAM(S): 1.25 SOLUTION, CONCENTRATE RESPIRATORY (INHALATION) at 15:40

## 2018-02-12 RX ADMIN — Medication 100 MILLIGRAM(S): at 16:19

## 2018-02-12 RX ADMIN — Medication 75 MILLIGRAM(S): at 05:54

## 2018-02-12 RX ADMIN — LEVALBUTEROL 0.63 MILLIGRAM(S): 1.25 SOLUTION, CONCENTRATE RESPIRATORY (INHALATION) at 21:25

## 2018-02-12 RX ADMIN — Medication 75 MILLIGRAM(S): at 17:56

## 2018-02-12 RX ADMIN — LEVALBUTEROL 0.63 MILLIGRAM(S): 1.25 SOLUTION, CONCENTRATE RESPIRATORY (INHALATION) at 03:25

## 2018-02-12 RX ADMIN — BUDESONIDE AND FORMOTEROL FUMARATE DIHYDRATE 2 PUFF(S): 160; 4.5 AEROSOL RESPIRATORY (INHALATION) at 21:26

## 2018-02-12 RX ADMIN — LEVALBUTEROL 0.63 MILLIGRAM(S): 1.25 SOLUTION, CONCENTRATE RESPIRATORY (INHALATION) at 08:42

## 2018-02-12 RX ADMIN — Medication 50 MILLIGRAM(S): at 05:54

## 2018-02-12 RX ADMIN — BUDESONIDE AND FORMOTEROL FUMARATE DIHYDRATE 2 PUFF(S): 160; 4.5 AEROSOL RESPIRATORY (INHALATION) at 08:52

## 2018-02-12 NOTE — PROGRESS NOTE ADULT - SUBJECTIVE AND OBJECTIVE BOX
CHIEF COMPLAINT:    Interval Events:    REVIEW OF SYSTEMS:  Constitutional:   Eyes:  ENT:  CV:  Resp:  GI:  :  MSK:  Integumentary:  Neurological:  Psychiatric:  Endocrine:  Hematologic/Lymphatic:  Allergic/Immunologic:  [ ] All other systems negative  [ ] Unable to assess ROS because ________    OBJECTIVE:  ICU Vital Signs Last 24 Hrs  T(C): 37.1 (2018 05:49), Max: 38.1 (2018 14:33)  T(F): 98.7 (2018 05:49), Max: 100.5 (2018 14:33)  HR: 88 (2018 08:52) (80 - 125)  BP: 129/67 (2018 05:49) (129/67 - 197/84)  BP(mean): --  ABP: --  ABP(mean): --  RR: 16 (2018 05:49) (16 - 26)  SpO2: 98% (2018 08:52) (88% - 99%)        CAPILLARY BLOOD GLUCOSE          PHYSICAL EXAM:  General:   HEENT:   Lymph Nodes:  Neck:   Respiratory:   Cardiovascular:   Abdomen:   Extremities:   Skin:   Neurological:  Psychiatry:    HOSPITAL MEDICATIONS:  MEDICATIONS  (STANDING):  buDESOnide 160 MICROgram(s)/formoterol 4.5 MICROgram(s) Inhaler 2 Puff(s) Inhalation two times a day  levalbuterol Inhalation 0.63 milliGRAM(s) Inhalation every 6 hours  oseltamivir 75 milliGRAM(s) Oral two times a day  predniSONE   Tablet 50 milliGRAM(s) Oral daily    MEDICATIONS  (PRN):      LABS:                        13.6   8.75  )-----------( 186      ( 2018 14:44 )             43.2     02-11    133<L>  |  95<L>  |  16  ----------------------------<  127<H>  Test not performed SPECIMEN GROSSLY HEMOLYZED   |  26  |  0.82    Ca    7.5<L>      2018 14:44    TPro  7.7  /  Alb  3.9  /  TBili  0.2  /  DBili  x   /  AST  74<H>  /  ALT  25  /  AlkPhos  61  02-11      Urinalysis Basic - ( 2018 23:40 )    Color: PLYEL / Appearance: CLEAR / S.014 / pH: 6.5  Gluc: NEGATIVE / Ketone: NEGATIVE  / Bili: NEGATIVE / Urobili: NORMAL mg/dL   Blood: NEGATIVE / Protein: 20 mg/dL / Nitrite: NEGATIVE   Leuk Esterase: NEGATIVE / RBC: 0-2 / WBC 2-5   Sq Epi: OCC / Non Sq Epi: x / Bacteria: x        Venous Blood Gas:   @ 14:50  7.35/55/42/27/74.5  VBG Lactate: 1.9      MICROBIOLOGY:     RADIOLOGY:  [ ] Reviewed and interpreted by me    PULMONARY FUNCTION TESTS:    EKG: CHIEF COMPLAINT: Patient is a 77y old  Female who presents with a chief complaint of Asthma Exacerbation (2018 19:21)    Interval Events: No acute events overnight    REVIEW OF SYSTEMS:  Constitutional: Feeling better  Eyes: No difficulty  ENT: No difficulty  CV: Denies chest pain  Resp: c/o cough  GI: Denies abdominal distress  :  MSK:  Integumentary:  Neurological:  Psychiatric:  Endocrine:  Hematologic/Lymphatic:  Allergic/Immunologic:  [x] All other systems negative  [ ] Unable to assess ROS because ________    OBJECTIVE:  ICU Vital Signs Last 24 Hrs  T(C): 37.1 (2018 05:49), Max: 38.1 (2018 14:33)  T(F): 98.7 (2018 05:49), Max: 100.5 (2018 14:33)  HR: 88 (2018 08:52) (80 - 125)  BP: 129/67 (2018 05:49) (129/67 - 197/84)  BP(mean): --  ABP: --  ABP(mean): --  RR: 16 (2018 05:49) (16 - 26)  SpO2: 98% (2018 08:52) (88% - 99%)        CAPILLARY BLOOD GLUCOSE      HOSPITAL MEDICATIONS:  MEDICATIONS  (STANDING):  buDESOnide 160 MICROgram(s)/formoterol 4.5 MICROgram(s) Inhaler 2 Puff(s) Inhalation two times a day  levalbuterol Inhalation 0.63 milliGRAM(s) Inhalation every 6 hours  oseltamivir 75 milliGRAM(s) Oral two times a day  predniSONE   Tablet 50 milliGRAM(s) Oral daily    MEDICATIONS  (PRN):      LABS:                        13.6   8.75  )-----------( 186      ( 2018 14:44 )             43.2     02-11    133<L>  |  95<L>  |  16  ----------------------------<  127<H>  Test not performed SPECIMEN GROSSLY HEMOLYZED   |  26  |  0.82    Ca    7.5<L>      2018 14:44    TPro  7.7  /  Alb  3.9  /  TBili  0.2  /  DBili  x   /  AST  74<H>  /  ALT  25  /  AlkPhos  61  02-      Urinalysis Basic - ( 2018 23:40 )    Color: PLYEL / Appearance: CLEAR / S.014 / pH: 6.5  Gluc: NEGATIVE / Ketone: NEGATIVE  / Bili: NEGATIVE / Urobili: NORMAL mg/dL   Blood: NEGATIVE / Protein: 20 mg/dL / Nitrite: NEGATIVE   Leuk Esterase: NEGATIVE / RBC: 0-2 / WBC 2-5   Sq Epi: OCC / Non Sq Epi: x / Bacteria: x        Venous Blood Gas:   @ 14:50  7.35/55/42/27/74.5  VBG Lactate: 1.9      MICROBIOLOGY:     RADIOLOGY:  [ ] Reviewed and interpreted by me    PULMONARY FUNCTION TESTS:    EKG:

## 2018-02-13 LAB
BACTERIA UR CULT: SIGNIFICANT CHANGE UP
BUN SERPL-MCNC: 12 MG/DL — SIGNIFICANT CHANGE UP (ref 7–23)
CALCIUM SERPL-MCNC: 9.2 MG/DL — SIGNIFICANT CHANGE UP (ref 8.4–10.5)
CHLORIDE SERPL-SCNC: 100 MMOL/L — SIGNIFICANT CHANGE UP (ref 98–107)
CO2 SERPL-SCNC: 29 MMOL/L — SIGNIFICANT CHANGE UP (ref 22–31)
CREAT SERPL-MCNC: 0.58 MG/DL — SIGNIFICANT CHANGE UP (ref 0.5–1.3)
GLUCOSE SERPL-MCNC: 97 MG/DL — SIGNIFICANT CHANGE UP (ref 70–99)
HCT VFR BLD CALC: 41 % — SIGNIFICANT CHANGE UP (ref 34.5–45)
HGB BLD-MCNC: 12.3 G/DL — SIGNIFICANT CHANGE UP (ref 11.5–15.5)
MCHC RBC-ENTMCNC: 23.9 PG — LOW (ref 27–34)
MCHC RBC-ENTMCNC: 30 % — LOW (ref 32–36)
MCV RBC AUTO: 79.6 FL — LOW (ref 80–100)
NRBC # FLD: 0 — SIGNIFICANT CHANGE UP
PLATELET # BLD AUTO: 172 K/UL — SIGNIFICANT CHANGE UP (ref 150–400)
PMV BLD: 10.5 FL — SIGNIFICANT CHANGE UP (ref 7–13)
POTASSIUM SERPL-MCNC: 4 MMOL/L — SIGNIFICANT CHANGE UP (ref 3.5–5.3)
POTASSIUM SERPL-SCNC: 4 MMOL/L — SIGNIFICANT CHANGE UP (ref 3.5–5.3)
RBC # BLD: 5.15 M/UL — SIGNIFICANT CHANGE UP (ref 3.8–5.2)
RBC # FLD: 14.5 % — SIGNIFICANT CHANGE UP (ref 10.3–14.5)
SODIUM SERPL-SCNC: 140 MMOL/L — SIGNIFICANT CHANGE UP (ref 135–145)
SPECIMEN SOURCE: SIGNIFICANT CHANGE UP
WBC # BLD: 9.22 K/UL — SIGNIFICANT CHANGE UP (ref 3.8–10.5)
WBC # FLD AUTO: 9.22 K/UL — SIGNIFICANT CHANGE UP (ref 3.8–10.5)

## 2018-02-13 PROCEDURE — 99232 SBSQ HOSP IP/OBS MODERATE 35: CPT

## 2018-02-13 RX ORDER — IPRATROPIUM/ALBUTEROL SULFATE 18-103MCG
3 AEROSOL WITH ADAPTER (GRAM) INHALATION EVERY 6 HOURS
Qty: 0 | Refills: 0 | Status: DISCONTINUED | OUTPATIENT
Start: 2018-02-13 | End: 2018-02-14

## 2018-02-13 RX ORDER — ALBUTEROL 90 UG/1
2 AEROSOL, METERED ORAL EVERY 4 HOURS
Qty: 0 | Refills: 0 | Status: DISCONTINUED | OUTPATIENT
Start: 2018-02-13 | End: 2018-02-14

## 2018-02-13 RX ORDER — ACETAMINOPHEN 500 MG
650 TABLET ORAL EVERY 6 HOURS
Qty: 0 | Refills: 0 | Status: DISCONTINUED | OUTPATIENT
Start: 2018-02-13 | End: 2018-02-14

## 2018-02-13 RX ORDER — IPRATROPIUM/ALBUTEROL SULFATE 18-103MCG
3 AEROSOL WITH ADAPTER (GRAM) INHALATION ONCE
Qty: 0 | Refills: 0 | Status: COMPLETED | OUTPATIENT
Start: 2018-02-13 | End: 2018-02-13

## 2018-02-13 RX ORDER — LEVALBUTEROL 1.25 MG/.5ML
0.63 SOLUTION, CONCENTRATE RESPIRATORY (INHALATION) ONCE
Qty: 0 | Refills: 0 | Status: COMPLETED | OUTPATIENT
Start: 2018-02-13 | End: 2018-02-13

## 2018-02-13 RX ORDER — AMLODIPINE BESYLATE 2.5 MG/1
5 TABLET ORAL DAILY
Qty: 0 | Refills: 0 | Status: DISCONTINUED | OUTPATIENT
Start: 2018-02-13 | End: 2018-02-14

## 2018-02-13 RX ADMIN — BUDESONIDE AND FORMOTEROL FUMARATE DIHYDRATE 2 PUFF(S): 160; 4.5 AEROSOL RESPIRATORY (INHALATION) at 08:39

## 2018-02-13 RX ADMIN — Medication 75 MILLIGRAM(S): at 17:13

## 2018-02-13 RX ADMIN — LEVALBUTEROL 0.63 MILLIGRAM(S): 1.25 SOLUTION, CONCENTRATE RESPIRATORY (INHALATION) at 04:32

## 2018-02-13 RX ADMIN — Medication 650 MILLIGRAM(S): at 06:30

## 2018-02-13 RX ADMIN — ALBUTEROL 2 PUFF(S): 90 AEROSOL, METERED ORAL at 18:46

## 2018-02-13 RX ADMIN — Medication 100 MILLIGRAM(S): at 21:32

## 2018-02-13 RX ADMIN — Medication 75 MILLIGRAM(S): at 05:26

## 2018-02-13 RX ADMIN — LEVALBUTEROL 0.63 MILLIGRAM(S): 1.25 SOLUTION, CONCENTRATE RESPIRATORY (INHALATION) at 10:37

## 2018-02-13 RX ADMIN — LEVALBUTEROL 0.63 MILLIGRAM(S): 1.25 SOLUTION, CONCENTRATE RESPIRATORY (INHALATION) at 15:04

## 2018-02-13 RX ADMIN — Medication 100 MILLIGRAM(S): at 02:26

## 2018-02-13 RX ADMIN — BUDESONIDE AND FORMOTEROL FUMARATE DIHYDRATE 2 PUFF(S): 160; 4.5 AEROSOL RESPIRATORY (INHALATION) at 19:23

## 2018-02-13 RX ADMIN — Medication 650 MILLIGRAM(S): at 05:39

## 2018-02-13 RX ADMIN — LEVALBUTEROL 0.63 MILLIGRAM(S): 1.25 SOLUTION, CONCENTRATE RESPIRATORY (INHALATION) at 08:30

## 2018-02-13 RX ADMIN — Medication 3 MILLILITER(S): at 21:12

## 2018-02-13 RX ADMIN — Medication 3 MILLILITER(S): at 01:27

## 2018-02-13 RX ADMIN — AMLODIPINE BESYLATE 5 MILLIGRAM(S): 2.5 TABLET ORAL at 13:43

## 2018-02-13 RX ADMIN — Medication 50 MILLIGRAM(S): at 05:26

## 2018-02-13 NOTE — DISCHARGE NOTE ADULT - PATIENT PORTAL LINK FT
You can access the [facility] Patient Portal, offered by [facility], by registering with the following website: [email or web address]

## 2018-02-13 NOTE — PHYSICAL THERAPY INITIAL EVALUATION ADULT - PERTINENT HX OF CURRENT PROBLEM, REHAB EVAL
77F hx of Asthma, Hypertension presents to [facility] ED c/o 3 days of wheezing, shortness of breath and productive cough with white sputum. Being admitted for influenza induced asthma exacerbation

## 2018-02-13 NOTE — DISCHARGE NOTE ADULT - MEDICATION SUMMARY - MEDICATIONS TO TAKE
I will START or STAY ON the medications listed below when I get home from the hospital:    predniSONE 10 mg oral tablet  -- 4 tab  - by mouth once a day x 3 days  3 tab- by mouth once a day x 3 days  2 tab- by mouth once a day x 3 days  1 tab- by mouth once a day x 3 day  -- It is very important that you take or use this exactly as directed.  Do not skip doses or discontinue unless directed by your doctor.  Obtain medical advice before taking any non-prescription drugs as some may affect the action of this medication.  Take with food or milk.    -- Indication: For Asthma exacerbation    acetaminophen 325 mg oral tablet  -- 2 tab(s) by mouth every 6 hours, As needed, Moderate Pain (4 - 6)  -- Indication: For Influenza    benzonatate 100 mg oral capsule  -- 1 cap(s) by mouth 3 times a day, As needed, Cough  -- Indication: For cough    oseltamivir 75 mg oral capsule  -- 1 cap(s) by mouth 2 times a day  -- Indication: For Influenza    albuterol 90 mcg/inh inhalation aerosol  -- 2 puff(s) inhaled every 4 hours, As needed, Shortness of Breath and/or Wheezing  -- Indication: For Asthma exacerbation    Symbicort 160 mcg-4.5 mcg/inh inhalation aerosol  -- 2 puff(s) inhaled 2 times a day   -- Check with your doctor before becoming pregnant.  For inhalation only.  Rinse mouth thoroughly after use.    -- Indication: For Asthma exacerbation    Atrovent HFA 17 mcg/inh inhalation aerosol  -- 2 puff(s) by metered dose inhaler 4 times a day, As Needed   -- For inhalation only.  It is very important that you take or use this exactly as directed.  Do not skip doses or discontinue unless directed by your doctor.    -- Indication: For Asthma exacerbation    amLODIPine 10 mg oral tablet  -- 1 tab(s) by mouth once a day  -- Indication: For Essential hypertension

## 2018-02-13 NOTE — PROGRESS NOTE ADULT - SUBJECTIVE AND OBJECTIVE BOX
CHIEF COMPLAINT: Patient is a 77y old  Female who presents with a chief complaint of Asthma Exacerbation (2018 19:21)    Interval Events:      REVIEW OF SYSTEMS:  Constitutional:   Eyes:  ENT:  CV:  Resp:  GI:  :  MSK:  Integumentary:  Neurological:  Psychiatric:  Endocrine:  Hematologic/Lymphatic:  Allergic/Immunologic:  [ ] All other systems negative  [ ] Unable to assess ROS because ________      OBJECTIVE:  ICU Vital Signs Last 24 Hrs  T(C): 37.1 (2018 05:16), Max: 37.6 (2018 21:50)  T(F): 98.7 (2018 05:16), Max: 99.6 (2018 21:50)  HR: 96 (2018 05:16) (87 - 112)  BP: 156/80 (2018 05:21) (156/80 - 169/70)  BP(mean): --  ABP: --  ABP(mean): --  RR: 20 (2018 05:16) (16 - 20)  SpO2: 94% (2018 05:16) (93% - 98%)      HOSPITAL MEDICATIONS:  MEDICATIONS  (STANDING):  buDESOnide 160 MICROgram(s)/formoterol 4.5 MICROgram(s) Inhaler 2 Puff(s) Inhalation two times a day  levalbuterol Inhalation 0.63 milliGRAM(s) Inhalation every 6 hours  oseltamivir 75 milliGRAM(s) Oral two times a day  predniSONE   Tablet 50 milliGRAM(s) Oral daily    MEDICATIONS  (PRN):  acetaminophen   Tablet. 650 milliGRAM(s) Oral every 6 hours PRN Moderate Pain (4 - 6)  benzonatate 100 milliGRAM(s) Oral three times a day PRN Cough      LABS:                        12.3   9.22  )-----------( 172      ( 2018 05:51 )             41.0     02-13    140  |  100  |  12  ----------------------------<  97  4.0   |  29  |  0.58    Ca    9.2      2018 05:51    TPro  7.7  /  Alb  3.9  /  TBili  0.2  /  DBili  x   /  AST  74<H>  /  ALT  25  /  AlkPhos  61  02-11      Urinalysis Basic - ( 2018 23:40 )    Color: PLYEL / Appearance: CLEAR / S.014 / pH: 6.5  Gluc: NEGATIVE / Ketone: NEGATIVE  / Bili: NEGATIVE / Urobili: NORMAL mg/dL   Blood: NEGATIVE / Protein: 20 mg/dL / Nitrite: NEGATIVE   Leuk Esterase: NEGATIVE / RBC: 0-2 / WBC 2-5   Sq Epi: OCC / Non Sq Epi: x / Bacteria: x        Venous Blood Gas:   @ 14:50  7.35/55/42/27/74.5  VBG Lactate: 1.9      MICROBIOLOGY:     RADIOLOGY:  [ ] Reviewed and interpreted by me    PULMONARY FUNCTION TESTS:    EKG: CHIEF COMPLAINT: Patient is a 77y old  Female who presents with a chief complaint of Asthma Exacerbation (11 Feb 2018 19:21)    Interval Events: none overnight      REVIEW OF SYSTEMS:  CV: denies  Resp: HERNANDEZ  GI: denies  [x] All other systems negative  [ ] Unable to assess ROS because ________      OBJECTIVE:  ICU Vital Signs Last 24 Hrs  T(C): 37.1 (13 Feb 2018 05:16), Max: 37.6 (12 Feb 2018 21:50)  T(F): 98.7 (13 Feb 2018 05:16), Max: 99.6 (12 Feb 2018 21:50)  HR: 96 (13 Feb 2018 05:16) (87 - 112)  BP: 156/80 (13 Feb 2018 05:21) (156/80 - 169/70)  BP(mean): --  ABP: --  ABP(mean): --  RR: 20 (13 Feb 2018 05:16) (16 - 20)  SpO2: 94% (13 Feb 2018 05:16) (93% - 98%)      HOSPITAL MEDICATIONS:  MEDICATIONS  (STANDING):  buDESOnide 160 MICROgram(s)/formoterol 4.5 MICROgram(s) Inhaler 2 Puff(s) Inhalation two times a day  levalbuterol Inhalation 0.63 milliGRAM(s) Inhalation every 6 hours  oseltamivir 75 milliGRAM(s) Oral two times a day  predniSONE   Tablet 50 milliGRAM(s) Oral daily    MEDICATIONS  (PRN):  acetaminophen   Tablet. 650 milliGRAM(s) Oral every 6 hours PRN Moderate Pain (4 - 6)  benzonatate 100 milliGRAM(s) Oral three times a day PRN Cough      LABS:                        12.3   9.22  )-----------( 172      ( 13 Feb 2018 05:51 )             41.0     02-13    140  |  100  |  12  ----------------------------<  97  4.0   |  29  |  0.58    Ca    9.2      13 Feb 2018 05:51

## 2018-02-13 NOTE — PROVIDER CONTACT NOTE (OTHER) - BACKGROUND
Patient admitting diagnosis is asthma with acute exacerbation. Patient on droplet for influenza. History of Asthma, HTN, etc.

## 2018-02-13 NOTE — PHYSICAL THERAPY INITIAL EVALUATION ADULT - PRECAUTIONS/LIMITATIONS, REHAB EVAL
isolation precautions/3L of O2 through nasal cannula; DROPLET-FLU/fall precautions/oxygen therapy device and L/min

## 2018-02-13 NOTE — PROVIDER CONTACT NOTE (OTHER) - ASSESSMENT
Patient AOX4 with forgetfulness. Patient has a frown on her head and rubbing her head. Patient encouraged to hydrate.

## 2018-02-13 NOTE — CHART NOTE - NSCHARTNOTEFT_GEN_A_CORE
77F hx of "Asthma", Hypertension presents to [facility] ED c/o 3 days of wheezing, shortness of breath and productive cough with white sputum. Being admitted for influenza.  Sergio states she was aformer smoker and was diagnosed with asthma only late in life.   Based on patients history and exam she most likely has COPD.  Given Room air hypoxia  to 87%.  With out sign of acute disease this is likely chronic hypoxia from COPD.  Pt will need home o2 at rest and exertion 2-3L

## 2018-02-13 NOTE — DISCHARGE NOTE ADULT - DURABLE MEDICAL EQUIPMENT AGENCY
[facility] [phone]: A Home Oxygen Concentrator w/Home Fill Portable System will be delivered to your home today. A  Portable Oxygen Tank will be delivered to your Hospital Room for Transport Home.

## 2018-02-13 NOTE — DISCHARGE NOTE ADULT - PLAN OF CARE
Resolution Complete course of Tamiflu Maintain optimal respiratory status Complete course of prednisone as directed  Continue to take Symbicort twice daily, even if you are feeling well  Use your ventolin inhaler every 4-6 hours as needed for shortness of breath or wheezing  Follow up with your primary care provider and pulmonologist in 1-2 weeks

## 2018-02-13 NOTE — DISCHARGE NOTE ADULT - CARE PLAN
Principal Discharge DX:	Influenza  Goal:	Resolution  Assessment and plan of treatment:	Complete course of Tamiflu  Secondary Diagnosis:	Asthma exacerbation  Goal:	Maintain optimal respiratory status  Assessment and plan of treatment:	Complete course of prednisone as directed  Continue to take Symbicort twice daily, even if you are feeling well  Use your ventolin inhaler every 4-6 hours as needed for shortness of breath or wheezing  Follow up with your primary care provider and pulmonologist in 1-2 weeks

## 2018-02-13 NOTE — PHYSICAL THERAPY INITIAL EVALUATION ADULT - PATIENT PROFILE REVIEW, REHAB EVAL
No Formal Activity Order in the computer; spoke with RN Ginette Hermosillo prior to PT evaluation--> Pt OK for PT consult/OOB activity; PT made aware that pt desats on room air/yes

## 2018-02-13 NOTE — PHYSICAL THERAPY INITIAL EVALUATION ADULT - GENERAL OBSERVATIONS, REHAB EVAL
Pt encountered seated in chair, no distress, AxOx3, with +IV, +tele, and 3L of O2 through nasal cannula.

## 2018-02-13 NOTE — PHYSICAL THERAPY INITIAL EVALUATION ADULT - ADDITIONAL COMMENTS
Pt reports that she lives in a private house with her son with ~2STE; bedroom/bathroom on first floor. Prior to hospital admission pt was completely independent where she used single axis cane on/off with ambulation. Pt denies any recent fall. Pt's last fall was October 13, 2017 which resulted in left wrist distal radial fracture.    Pt left comfortable in chair, NAD, all lines intact, all precautions maintained, with call bell in reach, and RN aware of PT evaluation. Pt reports that she lives in a private house with her son with ~2STE; bedroom/bathroom on first floor. Prior to hospital admission pt was completely independent where she used single axis cane on/off with ambulation. Pt denies any recent fall. Pt's last fall was October 13, 2017 which resulted in left wrist distal radial fracture.    O2 sats/ HR monitored throughout ambulation on 3L of O2: O2sats 94-97%; HR- 118-130bpm; RN made aware    Pt left comfortable in chair, NAD, all lines intact, all precautions maintained, with call bell in reach, and RN aware of PT evaluation.

## 2018-02-14 VITALS — OXYGEN SATURATION: 98 %

## 2018-02-14 PROCEDURE — 99232 SBSQ HOSP IP/OBS MODERATE 35: CPT

## 2018-02-14 RX ORDER — AMLODIPINE BESYLATE 2.5 MG/1
10 TABLET ORAL DAILY
Qty: 0 | Refills: 0 | Status: DISCONTINUED | OUTPATIENT
Start: 2018-02-15 | End: 2018-02-14

## 2018-02-14 RX ORDER — AMLODIPINE BESYLATE 2.5 MG/1
5 TABLET ORAL ONCE
Qty: 0 | Refills: 0 | Status: COMPLETED | OUTPATIENT
Start: 2018-02-14 | End: 2018-02-14

## 2018-02-14 RX ORDER — IPRATROPIUM BROMIDE 0.2 MG/ML
2 SOLUTION, NON-ORAL INHALATION
Qty: 1 | Refills: 0 | OUTPATIENT
Start: 2018-02-14

## 2018-02-14 RX ORDER — CHLORHEXIDINE GLUCONATE 213 G/1000ML
1 SOLUTION TOPICAL DAILY
Qty: 0 | Refills: 0 | Status: DISCONTINUED | OUTPATIENT
Start: 2018-02-14 | End: 2018-02-14

## 2018-02-14 RX ORDER — AMLODIPINE BESYLATE 2.5 MG/1
1 TABLET ORAL
Qty: 30 | Refills: 0 | OUTPATIENT
Start: 2018-02-14 | End: 2018-03-15

## 2018-02-14 RX ORDER — ACETAMINOPHEN 500 MG
2 TABLET ORAL
Qty: 0 | Refills: 0 | COMMUNITY
Start: 2018-02-14

## 2018-02-14 RX ORDER — BUDESONIDE AND FORMOTEROL FUMARATE DIHYDRATE 160; 4.5 UG/1; UG/1
2 AEROSOL RESPIRATORY (INHALATION)
Qty: 1 | Refills: 0 | OUTPATIENT
Start: 2018-02-14

## 2018-02-14 RX ORDER — ALBUTEROL 90 UG/1
2 AEROSOL, METERED ORAL
Qty: 1 | Refills: 0 | OUTPATIENT
Start: 2018-02-14

## 2018-02-14 RX ADMIN — Medication 75 MILLIGRAM(S): at 18:29

## 2018-02-14 RX ADMIN — Medication 3 MILLILITER(S): at 03:45

## 2018-02-14 RX ADMIN — Medication 3 MILLILITER(S): at 09:36

## 2018-02-14 RX ADMIN — BUDESONIDE AND FORMOTEROL FUMARATE DIHYDRATE 2 PUFF(S): 160; 4.5 AEROSOL RESPIRATORY (INHALATION) at 09:50

## 2018-02-14 RX ADMIN — AMLODIPINE BESYLATE 5 MILLIGRAM(S): 2.5 TABLET ORAL at 06:48

## 2018-02-14 RX ADMIN — CHLORHEXIDINE GLUCONATE 1 APPLICATION(S): 213 SOLUTION TOPICAL at 12:46

## 2018-02-14 RX ADMIN — ALBUTEROL 2 PUFF(S): 90 AEROSOL, METERED ORAL at 10:46

## 2018-02-14 RX ADMIN — AMLODIPINE BESYLATE 5 MILLIGRAM(S): 2.5 TABLET ORAL at 10:45

## 2018-02-14 RX ADMIN — Medication 50 MILLIGRAM(S): at 06:47

## 2018-02-14 RX ADMIN — Medication 100 MILLIGRAM(S): at 06:47

## 2018-02-14 RX ADMIN — Medication 3 MILLILITER(S): at 16:19

## 2018-02-14 RX ADMIN — Medication 75 MILLIGRAM(S): at 06:46

## 2018-02-14 NOTE — PROGRESS NOTE ADULT - PROBLEM SELECTOR PLAN 1
- persistent wheezing and HERNANDEZ  - cont steroids, nebs  - supplemental oxygen as needed  - monitor
Taper prednisone  DuoNebs q4h  Symbicort BID   Peak Flow  Wean off oxygen
- persistent wheezing and HERNANDEZ on admission now improved   - cont steroids, nebs, pt will have a slow taper at home   - supplemental oxygen as needed ordered for home   - Pt is insistent on d/c to home , she is feeling better and says she will sleep better at home.  Son met with SW and in agreement for her to be dc home with 02

## 2018-02-14 NOTE — PROGRESS NOTE ADULT - PROBLEM SELECTOR PLAN 3
- will start amloipine 5mg QD  - monitor BP
Not on antihypertensives   dash diet
- will start amloipine 5mg QD  - monitor BP

## 2018-02-14 NOTE — PROGRESS NOTE ADULT - SUBJECTIVE AND OBJECTIVE BOX
CHIEF COMPLAINT:    Interval Events:    REVIEW OF SYSTEMS:  Constitutional:   Eyes:  ENT:  CV:  Resp:  GI:  :  MSK:  Integumentary:  Neurological:  Psychiatric:  Endocrine:  Hematologic/Lymphatic:  Allergic/Immunologic:  [ ] All other systems negative  [ ] Unable to assess ROS because ________    OBJECTIVE:  ICU Vital Signs Last 24 Hrs  T(C): 37.1 (14 Feb 2018 06:51), Max: 37.2 (13 Feb 2018 23:41)  T(F): 98.7 (14 Feb 2018 06:51), Max: 98.9 (13 Feb 2018 23:41)  HR: 96 (14 Feb 2018 06:51) (96 - 110)  BP: 161/68 (14 Feb 2018 06:51) (154/86 - 165/84)  BP(mean): --  ABP: --  ABP(mean): --  RR: 20 (14 Feb 2018 06:51) (20 - 20)  SpO2: 98% (14 Feb 2018 06:51) (87% - 98%)        CAPILLARY BLOOD GLUCOSE          PHYSICAL EXAM:  General:   HEENT:   Lymph Nodes:  Neck:   Respiratory:   Cardiovascular:   Abdomen:   Extremities:   Skin:   Neurological:  Psychiatry:    HOSPITAL MEDICATIONS:  MEDICATIONS  (STANDING):  ALBUTerol/ipratropium for Nebulization 3 milliLiter(s) Nebulizer every 6 hours  amLODIPine   Tablet 5 milliGRAM(s) Oral daily  buDESOnide 160 MICROgram(s)/formoterol 4.5 MICROgram(s) Inhaler 2 Puff(s) Inhalation two times a day  chlorhexidine 4% Liquid 1 Application(s) Topical daily  oseltamivir 75 milliGRAM(s) Oral two times a day  predniSONE   Tablet 50 milliGRAM(s) Oral daily    MEDICATIONS  (PRN):  acetaminophen   Tablet. 650 milliGRAM(s) Oral every 6 hours PRN Moderate Pain (4 - 6)  ALBUTerol    90 MICROgram(s) HFA Inhaler 2 Puff(s) Inhalation every 4 hours PRN Shortness of Breath and/or Wheezing  benzonatate 100 milliGRAM(s) Oral three times a day PRN Cough      LABS:                        12.3   9.22  )-----------( 172      ( 13 Feb 2018 05:51 )             41.0     02-13    140  |  100  |  12  ----------------------------<  97  4.0   |  29  |  0.58    Ca    9.2      13 Feb 2018 05:51                MICROBIOLOGY:     RADIOLOGY:  [ ] Reviewed and interpreted by me    PULMONARY FUNCTION TESTS:    EKG: CHIEF COMPLAINT: Patient is a 77y old  Female who presents with a chief complaint of Asthma Exacerbation (13 Feb 2018 07:32)      Interval Events: Pt requesting to go home     REVIEW OF SYSTEMS:  Constitutional: Feeling better   Eyes:  ENT:  CV: Denies  Resp: + cough, breathing better   [x ] All other systems negative      OBJECTIVE:  ICU Vital Signs Last 24 Hrs  T(C): 37.1 (14 Feb 2018 06:51), Max: 37.2 (13 Feb 2018 23:41)  T(F): 98.7 (14 Feb 2018 06:51), Max: 98.9 (13 Feb 2018 23:41)  HR: 96 (14 Feb 2018 06:51) (96 - 110)  BP: 161/68 (14 Feb 2018 06:51) (154/86 - 165/84)  BP(mean): --  ABP: --  ABP(mean): --  RR: 20 (14 Feb 2018 06:51) (20 - 20)  SpO2: 98% (14 Feb 2018 06:51) (87% - 98%)        CAPILLARY BLOOD GLUCOSE    HOSPITAL MEDICATIONS:  MEDICATIONS  (STANDING):  ALBUTerol/ipratropium for Nebulization 3 milliLiter(s) Nebulizer every 6 hours  amLODIPine   Tablet 5 milliGRAM(s) Oral daily  buDESOnide 160 MICROgram(s)/formoterol 4.5 MICROgram(s) Inhaler 2 Puff(s) Inhalation two times a day  chlorhexidine 4% Liquid 1 Application(s) Topical daily  oseltamivir 75 milliGRAM(s) Oral two times a day  predniSONE   Tablet 50 milliGRAM(s) Oral daily    MEDICATIONS  (PRN):  acetaminophen   Tablet. 650 milliGRAM(s) Oral every 6 hours PRN Moderate Pain (4 - 6)  ALBUTerol    90 MICROgram(s) HFA Inhaler 2 Puff(s) Inhalation every 4 hours PRN Shortness of Breath and/or Wheezing  benzonatate 100 milliGRAM(s) Oral three times a day PRN Cough      LABS:                        12.3   9.22  )-----------( 172      ( 13 Feb 2018 05:51 )             41.0     02-13    140  |  100  |  12  ----------------------------<  97  4.0   |  29  |  0.58    Ca    9.2      13 Feb 2018 05:51                MICROBIOLOGY:     RADIOLOGY:  [ ] Reviewed and interpreted by me    PULMONARY FUNCTION TESTS:    EKG:

## 2018-02-14 NOTE — PROGRESS NOTE ADULT - ASSESSMENT
77F hx of Asthma, Hypertension presents to [facility] ED c/o 3 days of wheezing, shortness of breath and productive cough with white sputum. Being admitted for influenza induced asthma exacerbation
77F hx of Asthma, Hypertension presents to [facility] ED c/o 3 days of wheezing, shortness of breath and productive cough with white sputum. Being admitted for influenza induced asthma exacerbation
77F hx of Asthma, Hypertension presents to [facility] ED c/o 3 days of wheezing, shortness of breath and productive cough with white sputum. Being admitted for influenza induced asthma exacerbation

## 2018-02-14 NOTE — PROGRESS NOTE ADULT - ATTENDING COMMENTS
Asthma exacerbation in setting of influenza AH3.  Patient likely has underlying COPD as well as she reports that she intermittently has required oxygen in the past.  She desaturates to 87% on room air and to 81% with ambulation. Will need home O2 set up.  Continues to wheeze to but overall improved.  Continue Tamiflu, prednisone, Symbicort.
Asthma exacerbation in setting of influenza AH3.  Patient likely has underlying COPD as well as she reports that she intermittently has required oxygen in the past.  She desaturates to 87% on room air. Will need home O2 set up.  Continue Tamiflu, prednisone, Symbicort.
Asthma exacerbation in setting of influenza AH3.  Continue Tamiflu, prednisone, Symbicort.  PT evaluation.  Discharge planning.

## 2018-02-14 NOTE — PROGRESS NOTE ADULT - NEUROLOGICAL
Alert & oriented; no sensory, motor or coordination deficits, normal reflexes
detailed exam
detailed exam

## 2018-02-14 NOTE — CHART NOTE - NSCHARTNOTEFT_GEN_A_CORE
77F hx of "Asthma', Hypertension presents to [facility] ED c/o 3 days of wheezing, shortness of breath and productive cough with white sputum, admitted for influenza.  Based on patients History and exam she has presumed COPD rather than just asthma.  Her room Air O2 is 87% at rest and requires 2-3 L nasal canula to correct.   She will require continues Home oxygen on discharge.

## 2018-02-14 NOTE — PROGRESS NOTE ADULT - NEUROLOGICAL DETAILS
responds to verbal commands/responds to pain/alert and oriented x 3
alert and oriented x 3/responds to pain/responds to verbal commands

## 2018-02-16 LAB
BACTERIA BLD CULT: SIGNIFICANT CHANGE UP
BACTERIA BLD CULT: SIGNIFICANT CHANGE UP

## 2018-02-26 ENCOUNTER — APPOINTMENT (OUTPATIENT)
Dept: PULMONOLOGY | Facility: CLINIC | Age: 78
End: 2018-02-26

## 2018-11-22 NOTE — ED ADULT TRIAGE NOTE - MEANS OF ARRIVAL
PATIENT NAME: KAZ LADD   MEDICAL RECORD NUMBER: [id number]   ACCOUNT NUMBER: [id number]   ADMIT DATE: 11/07/2017   DISCHARGE DATE:   ATTENDING PHYSICIAN: JITENDRA CHOI M.D.   ROOM #: [id number]   SERVICE: MED                                           CONSULTATION      CONSULTING PHYSICIAN: RONNY THAPA M.D. INFECTIOUS DISEASE   DATE OF CONSULT: 11/14/2017         The consult was called by Dr. Greene.      REASON FOR CONSULTATION:  Persistent leukocytosis in a patient with an acute   cholecystitis.      HISTORY OF PRESENT ILLNESS:  The patient was seen today, November 14, 2017.   History was taken from him and the daughter.  The patient was a terrible   historian, and the daughter gave most of the history.  The patient is 63 years   old, morbidly obese, has coronary artery disease, diabetes mellitus type 2,   chronic kidney disease, hypertension, and peripheral vascular disease.  He   apparently had been doing well until about 8 days ago when he started having a   right upper quadrant abdominal pain.  He then developed some vomiting, and the   daughter brought him to the emergency room for evaluation.  The patient was   found to have a distended gallbladder with acute cholecystitis.  The patient was   subsequently admitted for further management.  He had a cholecystostomy tube   placed, and it had been draining some dark fluid, but the culture from the fluid   did not grow any organisms.  The patient's white blood cell count was markedly   elevated, but after initial fall, it had become stagnated around 18,000, and so   Infectious Disease was asked to see him today to help with the antibiotic   management.  The patient has been on Zosyn and clindamycin for the last week.      REVIEW OF SYSTEMS:  He denied vomiting although he was vomiting when he came to   the ED.  He also has not had any diarrhea.  Currently, he has no pain and has no   fevers, chills, or rigors.  Review of the rest of his systems is  unremarkable.      PAST MEDICAL HISTORY:  Significant for diabetes mellitus type 2, obesity,   coronary artery disease, chronic kidney disease, hypertension, and   hyperlipidemia.  The patient also has peripheral vascular disease.      PAST SURGICAL HISTORY:  Includes carotid endarterectomy and coronary artery   bypass graft.  He has had kidney stone extraction as well as appendicectomy.      SOCIAL HISTORY:  The patient quit smoking many years ago and does not drink   alcohol except on his bad days.  He has no history of drug use.      ALLERGIES:  HE HAS NO KNOWN DRUG ALLERGIES.      CURRENT MEDICATIONS:  Include the following.  He is on hydralazine, carvedilol,   hydromorphone, famotidine, Zosyn, ondansetron, potassium chloride, heparin,   saccharomyces, and clindamycin.      PHYSICAL EXAMINATION:  GENERAL:  Showed a middle-aged obese man.  He looked   chronically ill, and his speech was a little impaired.  He apparently had a   stroke before.  He was in no acute distress.   VITAL SIGNS:  His temperature was 36.5, pulse 62, respiratory rate 16, and the   blood pressure was 127/73.  These were normal.   HEAD AND NECK:  Unremarkable.   HEART:  Heart sounds 1 and 2 present and normal.   LUNGS:  Had fairly good air entry bilaterally.  He had prolonged expiration.   There were no wheezes, and there were no crackles.  The patient had no bronchial   breath sounds.   ABDOMEN:  Full.  It was soft and had tenderness with some guarding in the right   upper quadrant.  The patient had a pigtail drain which had brownish black fluid,   and it did not look like bile, it looked like altered blood.  The bowel sounds   were present and normal.   NEUROLOGIC:  The patient was alert and oriented to himself and place.  His   memory was terrible.  He moved his extremities.   EXTREMITIES:  He had no pedal edema.      LABORATORY DATA:  His labs showed BUN of 118 and creatinine of 5.7.  The patient   has chronic kidney disease which was  pretty advanced.  His creatinine clearance   was only around 13.  His liver enzymes showed elevation of alkaline phosphatase   at 185, AST at 43.  Bilirubin and ALT were normal.  Blood glucose was 265, which   was elevated.  Hemoglobin 12.4, WBC 18.2, and platelet count was 176.  The   patient was anemic with persistent leukocytosis.  The differentials on the white   count shows 67% neutrophils, and the absolute neutrophil count was elevated.  I   did review the patient's CBCs over the last year, and his white count has always   been above normal, not a single measurement was normal.  The MRSA screen was   negative, and aerobic and anaerobic cultures from the fluid from the gallbladder   were negative.  The patient had no organisms on the Gram stain or any white   cells on the Gram stain.  The patient had a cholangiogram today, and it showed   appropriate positioning of the cholecystostomy tube.  There was sludge filled   gallbladder without antegrade flow to the cystic duct or the common bile duct.   The patient's chest x-ray showed evidence of prior mediastinum and CABG repair.   Early pneumonitis could not be excluded from the patchy opacities at both lung   bases which were likely due to atelectasis.      ASSESSMENT:   1.  Leukocytosis.  This is most likely due to the acute cholecystitis.  It has       come down considerably as the patient came down with white count of 40,000,       but it is stagnated around 18,000.  I suspect primarily because the       gallbladder may be necrotic given that the fluid from it did not really       show any organisms.  It is less likely that the patient has an infection as       a primary cause of the persistent leukocytosis.  The differential certainly       would include things like Clostridium difficile colitis, which I do not       think the patient has now.  I will change the antibiotics around a little       bit to broaden it, and if the white count does not come down in  the next       few days, the only real chance of getting it down will be to remove the       gallbladder.   2.  Acute cholecystitis.  The patient seems to still have a blockade of the       cystic duct, and I suspect the gallbladder wall is necrotic due to       ischemia, and the patient may well need a cholecystectomy earlier than       anticipated.   3.  Coronary artery disease, status post coronary artery bypass graft.   4.  Chronic kidney disease which was pretty advanced.   5.  Diabetes mellitus type 2.  The patient is high risk to have fungal       infection in the gallbladder as well, and we will cover him for that.   6.  Obesity.   7.  Hypertension.   8.  Hyperlipidemia.      RECOMMENDATIONS:   1.  Please stop clindamycin.   2.  Stop Zosyn.   3.  Give meropenem 1 g IV daily.   4.  Diflucan 200 mg IV daily.   5.  If the patient's white count does not respond to the antibiotics in the       next 48 to 72 hours, it may be necessary to consider taking out the       gallbladder.   6.  Infectious Disease will follow and give further recommendations as       necessary.   7.  The prognosis is guarded.   8.  The plan was discussed in details with the patient and the patient's nurse       as well as daughter, and the patient was in agreement.   9.  Infectious Disease will follow and give further recommendations as       necessary.   10. Thank you Dr. Greene for the consultation, and if you have any       questions, please feel free to call me.            _________________________________   Jay Alvarado M.D. INFECTIOUS DISEASE         CC:   MAN Negro M.D. Gia Compagnoni, M.D. WB/JO-ANN   DD: 11/14/2017  DT: 11/14/2017   TD: 14:45  TT: 15:52   [id number]         wheelchair

## 2019-04-02 NOTE — PHYSICAL THERAPY INITIAL EVALUATION ADULT - GAIT DEVIATIONS NOTED, PT EVAL
decreased step length/decreased bong/decreased stride length POST-OP DIAGNOSIS:  Ureteral stone 02-Apr-2019 18:56:48  Francisco Parks

## 2019-11-18 NOTE — ED PROVIDER NOTE - [facility] ED SYMPTOMS POS
Future appt:    Last Appointment:  Visit date not found  CHOLESTEROL, TOTAL (mg/dL)   Date Value   09/19/2019 156     HDL CHOLESTEROL (mg/dL)   Date Value   09/19/2019 45     LDL-CHOLESTEROL (mg/dL (calc))   Date Value   09/19/2019 91     TRIGLYCERIDES (mg
TENDERNESS/PAIN/DEFORMITY/SOB/wheeze/BRUISING

## 2020-01-09 ENCOUNTER — APPOINTMENT (OUTPATIENT)
Dept: INTERNAL MEDICINE | Facility: CLINIC | Age: 80
End: 2020-01-09

## 2020-01-13 ENCOUNTER — APPOINTMENT (OUTPATIENT)
Dept: PULMONOLOGY | Facility: CLINIC | Age: 80
End: 2020-01-13

## 2020-02-13 ENCOUNTER — NON-APPOINTMENT (OUTPATIENT)
Age: 80
End: 2020-02-13

## 2020-02-13 ENCOUNTER — APPOINTMENT (OUTPATIENT)
Dept: INTERNAL MEDICINE | Facility: CLINIC | Age: 80
End: 2020-02-13
Payer: MEDICARE

## 2020-02-13 VITALS
DIASTOLIC BLOOD PRESSURE: 74 MMHG | BODY MASS INDEX: 27.64 KG/M2 | HEART RATE: 93 BPM | WEIGHT: 172 LBS | SYSTOLIC BLOOD PRESSURE: 166 MMHG | HEIGHT: 66 IN | OXYGEN SATURATION: 93 %

## 2020-02-13 DIAGNOSIS — H26.9 UNSPECIFIED CATARACT: ICD-10-CM

## 2020-02-13 PROCEDURE — 93000 ELECTROCARDIOGRAM COMPLETE: CPT

## 2020-02-13 PROCEDURE — G0439: CPT | Mod: 25

## 2020-02-13 PROCEDURE — 81003 URINALYSIS AUTO W/O SCOPE: CPT | Mod: QW

## 2020-02-13 PROCEDURE — 36415 COLL VENOUS BLD VENIPUNCTURE: CPT

## 2020-02-14 NOTE — PHYSICAL EXAM
[Normal] : no jugular venous distention, supple, no lymphadenopathy and the thyroid was normal and there were no nodules present [de-identified] : crackles in bilateral bases

## 2020-02-14 NOTE — HISTORY OF PRESENT ILLNESS
[de-identified] : annual exam \par \par c/o sob.  worse on exertion.  unable to tell me when it started only that for a long period of time as due to asthma.  \par no chest pain, palpitations, n/v/d/c \par mild LE edema\par

## 2020-02-14 NOTE — HEALTH RISK ASSESSMENT
[Very Good] : ~his/her~  mood as very good [Yes] : Yes [Monthly or less (1 pt)] : Monthly or less (1 point) [1 or 2 (0 pts)] : 1 or 2 (0 points) [Never (0 pts)] : Never (0 points) [No] : In the past 12 months have you used drugs other than those required for medical reasons? No [Patient reported bone density results were normal] : Patient reported bone density results were normal [Alone] : lives alone [Patient reported colonoscopy was normal] : Patient reported colonoscopy was normal [Retired] : retired [] :  [Independent] : using telephone [Fully functional (bathing, dressing, toileting, transferring, walking, feeding)] : Fully functional (bathing, dressing, toileting, transferring, walking, feeding) [Some assistance needed] : using transportation [] : No [Reports changes in hearing] : Reports no changes in hearing [Reports changes in vision] : Reports no changes in vision [Reports changes in dental health] : Reports no changes in dental health [ColonoscopyDate] : several years ago  [BoneDensityDate] : several years ago  [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

## 2020-02-14 NOTE — ASSESSMENT
[FreeTextEntry1] : \par sob, with h/o asthma \par -will need to get cxr \par -check BMP\par \par Annual \par -Advised to get yearly Flu shot -refused\par -Advised Yearly Eye exam with Ophthalmologist\par -Advised Yearly Dental exam\par -Educated of the importance of Healthy diet, such as Mediterranean Diet and Exercise, such as walking >20 minutes a day and increasing gradually as tolerated\par

## 2020-02-18 LAB
25(OH)D3 SERPL-MCNC: 41.8 NG/ML
ALBUMIN SERPL ELPH-MCNC: 3.9 G/DL
ALP BLD-CCNC: 70 U/L
ALT SERPL-CCNC: 8 U/L
ANION GAP SERPL CALC-SCNC: 14 MMOL/L
AST SERPL-CCNC: 14 U/L
BASOPHILS # BLD AUTO: 0.02 K/UL
BASOPHILS NFR BLD AUTO: 0.4 %
BILIRUB DIRECT SERPL-MCNC: 0.1 MG/DL
BILIRUB INDIRECT SERPL-MCNC: 0.2 MG/DL
BILIRUB SERPL-MCNC: 0.3 MG/DL
BUN SERPL-MCNC: 12 MG/DL
CALCIUM SERPL-MCNC: 9.5 MG/DL
CHLORIDE SERPL-SCNC: 102 MMOL/L
CHOLEST SERPL-MCNC: 205 MG/DL
CHOLEST/HDLC SERPL: 1.8 RATIO
CO2 SERPL-SCNC: 26 MMOL/L
CREAT SERPL-MCNC: 0.77 MG/DL
EOSINOPHIL # BLD AUTO: 0.08 K/UL
EOSINOPHIL NFR BLD AUTO: 1.5 %
ESTIMATED AVERAGE GLUCOSE: 114 MG/DL
FERRITIN SERPL-MCNC: 74 NG/ML
FOLATE SERPL-MCNC: >20 NG/ML
GLUCOSE SERPL-MCNC: 106 MG/DL
HBA1C MFR BLD HPLC: 5.6 %
HCT VFR BLD CALC: 40.3 %
HDLC SERPL-MCNC: 114 MG/DL
HGB BLD-MCNC: 12.6 G/DL
IMM GRANULOCYTES NFR BLD AUTO: 0 %
IRON SATN MFR SERPL: 69 %
IRON SERPL-MCNC: 172 UG/DL
LDLC SERPL CALC-MCNC: 78 MG/DL
LYMPHOCYTES # BLD AUTO: 2.14 K/UL
LYMPHOCYTES NFR BLD AUTO: 39.9 %
MAN DIFF?: NORMAL
MCHC RBC-ENTMCNC: 24.9 PG
MCHC RBC-ENTMCNC: 31.3 GM/DL
MCV RBC AUTO: 79.5 FL
MONOCYTES # BLD AUTO: 0.39 K/UL
MONOCYTES NFR BLD AUTO: 7.3 %
NEUTROPHILS # BLD AUTO: 2.73 K/UL
NEUTROPHILS NFR BLD AUTO: 50.9 %
NT-PROBNP SERPL-MCNC: 113 PG/ML
PLATELET # BLD AUTO: 193 K/UL
POTASSIUM SERPL-SCNC: 3.8 MMOL/L
PROT SERPL-MCNC: 6 G/DL
RBC # BLD: 5.07 M/UL
RBC # FLD: 15.1 %
SODIUM SERPL-SCNC: 143 MMOL/L
TIBC SERPL-MCNC: 250 UG/DL
TRIGL SERPL-MCNC: 68 MG/DL
TSH SERPL-ACNC: 1.22 UIU/ML
UIBC SERPL-MCNC: 79 UG/DL
VIT B12 SERPL-MCNC: 681 PG/ML
WBC # FLD AUTO: 5.36 K/UL

## 2020-02-19 ENCOUNTER — FORM ENCOUNTER (OUTPATIENT)
Age: 80
End: 2020-02-19

## 2020-02-20 ENCOUNTER — OUTPATIENT (OUTPATIENT)
Dept: OUTPATIENT SERVICES | Facility: HOSPITAL | Age: 80
LOS: 1 days | End: 2020-02-20
Payer: COMMERCIAL

## 2020-02-20 ENCOUNTER — APPOINTMENT (OUTPATIENT)
Dept: RADIOLOGY | Facility: IMAGING CENTER | Age: 80
End: 2020-02-20
Payer: MEDICARE

## 2020-02-20 DIAGNOSIS — R06.02 SHORTNESS OF BREATH: ICD-10-CM

## 2020-02-20 PROCEDURE — 71046 X-RAY EXAM CHEST 2 VIEWS: CPT | Mod: 26

## 2020-02-20 PROCEDURE — 71046 X-RAY EXAM CHEST 2 VIEWS: CPT

## 2020-05-13 ENCOUNTER — RX RENEWAL (OUTPATIENT)
Age: 80
End: 2020-05-13

## 2020-05-15 DIAGNOSIS — J30.9 ALLERGIC RHINITIS, UNSPECIFIED: ICD-10-CM

## 2020-07-01 ENCOUNTER — OUTPATIENT (OUTPATIENT)
Dept: OUTPATIENT SERVICES | Facility: HOSPITAL | Age: 80
LOS: 1 days | End: 2020-07-01
Payer: COMMERCIAL

## 2020-07-01 ENCOUNTER — APPOINTMENT (OUTPATIENT)
Dept: INTERNAL MEDICINE | Facility: CLINIC | Age: 80
End: 2020-07-01
Payer: MEDICARE

## 2020-07-01 ENCOUNTER — APPOINTMENT (OUTPATIENT)
Dept: CT IMAGING | Facility: HOSPITAL | Age: 80
End: 2020-07-01
Payer: MEDICARE

## 2020-07-01 VITALS — TEMPERATURE: 98.2 F

## 2020-07-01 VITALS
BODY MASS INDEX: 27.8 KG/M2 | WEIGHT: 173 LBS | HEIGHT: 66 IN | DIASTOLIC BLOOD PRESSURE: 92 MMHG | SYSTOLIC BLOOD PRESSURE: 164 MMHG | HEART RATE: 89 BPM | TEMPERATURE: 97.4 F

## 2020-07-01 VITALS — OXYGEN SATURATION: 93 %

## 2020-07-01 DIAGNOSIS — Z11.59 ENCOUNTER FOR SCREENING FOR OTHER VIRAL DISEASES: ICD-10-CM

## 2020-07-01 DIAGNOSIS — R06.02 SHORTNESS OF BREATH: ICD-10-CM

## 2020-07-01 DIAGNOSIS — R00.0 TACHYCARDIA, UNSPECIFIED: ICD-10-CM

## 2020-07-01 PROCEDURE — 71260 CT THORAX DX C+: CPT

## 2020-07-01 PROCEDURE — 36415 COLL VENOUS BLD VENIPUNCTURE: CPT

## 2020-07-01 PROCEDURE — 71260 CT THORAX DX C+: CPT | Mod: 26

## 2020-07-01 PROCEDURE — 99214 OFFICE O/P EST MOD 30 MIN: CPT | Mod: 25

## 2020-07-01 NOTE — REVIEW OF SYSTEMS
[Fatigue] : fatigue [Palpitations] : palpitations [Shortness Of Breath] : shortness of breath [Cough] : cough [Wheezing] : wheezing [Dizziness] : dizziness [Negative] : Neurological

## 2020-07-02 LAB
ALBUMIN SERPL ELPH-MCNC: 4.4 G/DL
ALP BLD-CCNC: 73 U/L
ALT SERPL-CCNC: 11 U/L
ANION GAP SERPL CALC-SCNC: 14 MMOL/L
AST SERPL-CCNC: 18 U/L
BASOPHILS # BLD AUTO: 0.01 K/UL
BASOPHILS NFR BLD AUTO: 0.2 %
BILIRUB DIRECT SERPL-MCNC: 0.1 MG/DL
BILIRUB INDIRECT SERPL-MCNC: 0.3 MG/DL
BILIRUB SERPL-MCNC: 0.4 MG/DL
BUN SERPL-MCNC: 10 MG/DL
CALCIUM SERPL-MCNC: 9.7 MG/DL
CHLORIDE SERPL-SCNC: 103 MMOL/L
CHOLEST SERPL-MCNC: 215 MG/DL
CHOLEST/HDLC SERPL: 1.9 RATIO
CO2 SERPL-SCNC: 25 MMOL/L
CREAT SERPL-MCNC: 0.75 MG/DL
EOSINOPHIL # BLD AUTO: 0.07 K/UL
EOSINOPHIL NFR BLD AUTO: 1.2 %
ESTIMATED AVERAGE GLUCOSE: 108 MG/DL
FOLATE SERPL-MCNC: >20 NG/ML
GLUCOSE SERPL-MCNC: 108 MG/DL
HBA1C MFR BLD HPLC: 5.4 %
HCT VFR BLD CALC: 43.4 %
HDLC SERPL-MCNC: 112 MG/DL
HGB BLD-MCNC: 13.1 G/DL
IMM GRANULOCYTES NFR BLD AUTO: 0.2 %
LDLC SERPL CALC-MCNC: 83 MG/DL
LYMPHOCYTES # BLD AUTO: 1.6 K/UL
LYMPHOCYTES NFR BLD AUTO: 26.4 %
MAN DIFF?: NORMAL
MCHC RBC-ENTMCNC: 24.3 PG
MCHC RBC-ENTMCNC: 30.2 GM/DL
MCV RBC AUTO: 80.4 FL
MONOCYTES # BLD AUTO: 0.39 K/UL
MONOCYTES NFR BLD AUTO: 6.4 %
NEUTROPHILS # BLD AUTO: 3.99 K/UL
NEUTROPHILS NFR BLD AUTO: 65.6 %
PLATELET # BLD AUTO: 191 K/UL
POTASSIUM SERPL-SCNC: 4.1 MMOL/L
PROT SERPL-MCNC: 6.6 G/DL
RBC # BLD: 5.4 M/UL
RBC # FLD: 16.1 %
SARS-COV-2 IGG SERPL IA-ACNC: 0.08 INDEX
SARS-COV-2 IGG SERPL QL IA: NEGATIVE
SODIUM SERPL-SCNC: 142 MMOL/L
TRIGL SERPL-MCNC: 101 MG/DL
TSH SERPL-ACNC: 1.64 UIU/ML
VIT B12 SERPL-MCNC: 670 PG/ML
WBC # FLD AUTO: 6.07 K/UL

## 2020-07-02 NOTE — ASSESSMENT
[FreeTextEntry1] : 79 year old female with h/o asthma presents for follow-up of sob.  was seen for the first time in 2/2020.  states was ok never back to baseline but starting several weeks ago was experiencing more soboe, especially while during her normal routine at home.  associated with palpitations, lightheadedness.has been getting worse.  was seen by cardio last year-echo, carotid doppler- normal.\par -get ct chest \par -will cehck labs \par

## 2020-07-02 NOTE — HISTORY OF PRESENT ILLNESS
[de-identified] : 79 year old female with h/o asthma presents for follow-up of sob.  was seen for the first time in 2/2020.  states was ok never back to baseline but starting several weeks ago was experiencing more soboe, especially while during her normal routine at home.  associated with palpitations, lightheadedness.has been getting worse.  was seen by cardio last year-echo, carotid doppler- normal.\par \par \par \par

## 2020-07-02 NOTE — PHYSICAL EXAM
[Soft] : abdomen soft [Non Tender] : non-tender [No Rash] : no rash [Non-distended] : non-distended [de-identified] : mild pitting LE edema bilaterally

## 2021-02-17 ENCOUNTER — NON-APPOINTMENT (OUTPATIENT)
Age: 81
End: 2021-02-17

## 2021-02-17 ENCOUNTER — APPOINTMENT (OUTPATIENT)
Dept: INTERNAL MEDICINE | Facility: CLINIC | Age: 81
End: 2021-02-17
Payer: MEDICARE

## 2021-02-17 VITALS
OXYGEN SATURATION: 95 % | HEIGHT: 66 IN | WEIGHT: 174 LBS | DIASTOLIC BLOOD PRESSURE: 70 MMHG | TEMPERATURE: 97.3 F | HEART RATE: 102 BPM | BODY MASS INDEX: 27.97 KG/M2 | SYSTOLIC BLOOD PRESSURE: 199 MMHG

## 2021-02-17 DIAGNOSIS — R06.02 SHORTNESS OF BREATH: ICD-10-CM

## 2021-02-17 DIAGNOSIS — R09.02 HYPOXEMIA: ICD-10-CM

## 2021-02-17 DIAGNOSIS — J39.8 OTHER SPECIFIED DISEASES OF UPPER RESPIRATORY TRACT: ICD-10-CM

## 2021-02-17 DIAGNOSIS — J45.909 UNSPECIFIED ASTHMA, UNCOMPLICATED: ICD-10-CM

## 2021-02-17 PROCEDURE — 36415 COLL VENOUS BLD VENIPUNCTURE: CPT

## 2021-02-17 PROCEDURE — 99072 ADDL SUPL MATRL&STAF TM PHE: CPT

## 2021-02-17 PROCEDURE — G0439: CPT

## 2021-02-17 RX ORDER — IPRATROPIUM BROMIDE 42 UG/1
0.06 SPRAY NASAL 3 TIMES DAILY
Qty: 36 | Refills: 3 | Status: ACTIVE | COMMUNITY
Start: 1900-01-01 | End: 1900-01-01

## 2021-02-17 NOTE — HISTORY OF PRESENT ILLNESS
[de-identified] : 80 year old female with h/o asthma, presents for annual exam.\par \par Still complaining of shortness of breath on normal exertion.  CT chest done last year was significant for tracheomalacia with flatteneed bronchi and sourav. lungs-clear.  Has refused to see pulmonologist in the past, multiple cancellations seen in all scripts.  States "they never do anything".  But in the same time has not seen anyone recently.  \par was seen by cardio last year-echo, carotid doppler- normal.\par \par Refusing all screening and preventative vaccinations at this time\par \par \par

## 2021-02-17 NOTE — PHYSICAL EXAM
[Soft] : abdomen soft [Non Tender] : non-tender [Non-distended] : non-distended [Normal Posterior Cervical Nodes] : no posterior cervical lymphadenopathy [Normal Anterior Cervical Nodes] : no anterior cervical lymphadenopathy [No CVA Tenderness] : no CVA  tenderness [No Joint Swelling] : no joint swelling [No Rash] : no rash [de-identified] : mild pitting LE edema bilaterally [de-identified] : Unsteady gait

## 2021-02-17 NOTE — ASSESSMENT
[FreeTextEntry1] : 80 year old female with h/o asthma presents for annual exam.\par \par Still complaining of shortness of breath on normal exertion.  CT chest done last year was significant for tracheomalacia with flattened bronchi and sourav. lungs-clear.  Has refused to see pulmonologist in the past, multiple cancellations seen in all scripts.  States "they never do anything".  But in the same time has not seen anyone recently.  \par was seen by cardio last year-echo, carotid doppler- normal.\par -strongly advised would like patient to get evaluated by pulmonology for poss bronchoscopy vs additional imaging. patient refusing, expressed her gratitude but again declining further evaluation at this time.\par \par Annual \par -Advised to get yearly Flu shot -refused\par -Advised Yearly Eye exam with Ophthalmologist\par -Advised Yearly Dental exam\par -Educated of the importance of Healthy diet, such as Mediterranean Diet and Exercise, such as walking >20 minutes a day and increasing gradually as tolerated.  \par \par Refusing all screening and preventative vaccinations at this time.  \par \par \par

## 2021-02-17 NOTE — REVIEW OF SYSTEMS
[Fatigue] : fatigue [Palpitations] : palpitations [Shortness Of Breath] : shortness of breath [Wheezing] : wheezing [Cough] : cough [Negative] : Neurological

## 2021-02-20 LAB
ALBUMIN SERPL ELPH-MCNC: 4.2 G/DL
ALP BLD-CCNC: 84 U/L
ALT SERPL-CCNC: 11 U/L
ANION GAP SERPL CALC-SCNC: 12 MMOL/L
AST SERPL-CCNC: 20 U/L
BASOPHILS # BLD AUTO: 0.03 K/UL
BASOPHILS NFR BLD AUTO: 0.5 %
BILIRUB DIRECT SERPL-MCNC: 0.1 MG/DL
BILIRUB INDIRECT SERPL-MCNC: 0.3 MG/DL
BILIRUB SERPL-MCNC: 0.4 MG/DL
BUN SERPL-MCNC: 11 MG/DL
CALCIUM SERPL-MCNC: 10 MG/DL
CHLORIDE SERPL-SCNC: 101 MMOL/L
CHOLEST SERPL-MCNC: 226 MG/DL
CO2 SERPL-SCNC: 27 MMOL/L
CREAT SERPL-MCNC: 0.78 MG/DL
EOSINOPHIL # BLD AUTO: 0.09 K/UL
EOSINOPHIL NFR BLD AUTO: 1.6 %
ESTIMATED AVERAGE GLUCOSE: 108 MG/DL
FOLATE SERPL-MCNC: >20 NG/ML
GLUCOSE SERPL-MCNC: 98 MG/DL
HBA1C MFR BLD HPLC: 5.4 %
HCT VFR BLD CALC: 43.6 %
HDLC SERPL-MCNC: 116 MG/DL
HGB BLD-MCNC: 13.3 G/DL
IMM GRANULOCYTES NFR BLD AUTO: 0.2 %
LDLC SERPL CALC-MCNC: 88 MG/DL
LYMPHOCYTES # BLD AUTO: 1.96 K/UL
LYMPHOCYTES NFR BLD AUTO: 34 %
MAN DIFF?: NORMAL
MCHC RBC-ENTMCNC: 24.8 PG
MCHC RBC-ENTMCNC: 30.5 GM/DL
MCV RBC AUTO: 81.2 FL
MONOCYTES # BLD AUTO: 0.41 K/UL
MONOCYTES NFR BLD AUTO: 7.1 %
NEUTROPHILS # BLD AUTO: 3.26 K/UL
NEUTROPHILS NFR BLD AUTO: 56.6 %
NONHDLC SERPL-MCNC: 111 MG/DL
PLATELET # BLD AUTO: 218 K/UL
POTASSIUM SERPL-SCNC: 4.4 MMOL/L
PROT SERPL-MCNC: 6.5 G/DL
RBC # BLD: 5.37 M/UL
RBC # FLD: 15.2 %
SAVE SPECIMEN: NORMAL
SODIUM SERPL-SCNC: 140 MMOL/L
TRIGL SERPL-MCNC: 114 MG/DL
TSH SERPL-ACNC: 1.85 UIU/ML
VIT B12 SERPL-MCNC: >2000 PG/ML
WBC # FLD AUTO: 5.76 K/UL

## 2022-02-22 ENCOUNTER — RX RENEWAL (OUTPATIENT)
Age: 82
End: 2022-02-22

## 2022-02-23 ENCOUNTER — APPOINTMENT (OUTPATIENT)
Dept: INTERNAL MEDICINE | Facility: CLINIC | Age: 82
End: 2022-02-23
Payer: MEDICARE

## 2022-02-23 ENCOUNTER — NON-APPOINTMENT (OUTPATIENT)
Age: 82
End: 2022-02-23

## 2022-02-23 VITALS
BODY MASS INDEX: 28.93 KG/M2 | OXYGEN SATURATION: 92 % | HEART RATE: 98 BPM | DIASTOLIC BLOOD PRESSURE: 80 MMHG | SYSTOLIC BLOOD PRESSURE: 183 MMHG | WEIGHT: 180 LBS | HEIGHT: 66 IN

## 2022-02-23 DIAGNOSIS — M25.561 PAIN IN RIGHT KNEE: ICD-10-CM
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
## 2022-02-23 DIAGNOSIS — M25.562 PAIN IN RIGHT KNEE: ICD-10-CM

## 2022-02-23 DIAGNOSIS — Z00.00 ENCOUNTER FOR GENERAL ADULT MEDICAL EXAMINATION W/OUT ABNORMAL FINDINGS: ICD-10-CM

## 2022-02-23 PROCEDURE — G0439: CPT

## 2022-02-23 PROCEDURE — 93000 ELECTROCARDIOGRAM COMPLETE: CPT

## 2022-02-23 PROCEDURE — 36415 COLL VENOUS BLD VENIPUNCTURE: CPT

## 2022-02-23 NOTE — HEALTH RISK ASSESSMENT
[Fair] :  ~his/her~ mood as fair [Never] : Never [No] : In the past 12 months have you used drugs other than those required for medical reasons? No [Two or more falls in past year] : Patient reported two or more falls in the past year [Assistive Device] : Patient uses an assistive device [0] : 2) Feeling down, depressed, or hopeless: Not at all (0) [PHQ-2 Negative - No further assessment needed] : PHQ-2 Negative - No further assessment needed [de-identified] : Cane [[id number]] : 0

## 2022-02-23 NOTE — ASSESSMENT
[FreeTextEntry1] : 81 year old female with h/o asthma presents for annual exam.\par \par Still complaining of shortness of breath on normal exertion.  CT chest done last year was significant for tracheomalacia with flattened bronchi and sourav. lungs-clear.  Has refused to see pulmonologist in the past, multiple cancellations seen in all scripts.  States "they never do anything".  But in the same time has not seen anyone recently.  \par was seen by cardio last year-echo, carotid doppler- normal.\par -strongly advised would like patient to get evaluated by pulmonology for poss bronchoscopy vs additional imaging. patient refusing, expressed her gratitude but again declining further evaluation at this time.\par \par multiple falls, unsteady gait, h/o osteoarthritis \par -NSAIDs, such as ibuprofen, Tylenol help decrease swelling, pain,\par -Apply ice on your back for 15 to 20 minutes every hour or as directed. Use an ice pack, or put crushed ice in a plastic bag. Cover it with a towel before you apply it to your skin. Ice helps prevent tissue damage and decreases pain.\par -physical therapy as directed. A physical therapist can teach you exercises to help improve movement and strength, and to decrease pain.\par -Fall precautions, advised to look into rolling wlaker for more stability while ambulating \par -Orhto referral \par \par \par Annual \par -Advised to get yearly Flu shot -refused\par -Advised Yearly Eye exam with Ophthalmologist\par -Advised Yearly Dental exam\par -Educated of the importance of Healthy diet, such as Mediterranean Diet and Exercise, such as walking >20 minutes a day and increasing gradually as tolerated.  \par \par Refusing all screening and preventative vaccinations at this time.  \par \par \par

## 2022-02-23 NOTE — HISTORY OF PRESENT ILLNESS
[de-identified] : 81 year old female presents for annual exam.  Accompanied by son\par \par has endorsed 2 mechanical falls this week.  One earlier this week while at home moving quickly to get front door, got caught in rug.  one occurring in parking lot prior to arrival.  States missed her footing and landed on her left knee.  Has a history of chronic right knee pain.  Denies any loss of consciousness or head trauma.\par Walks with a cane usually\par \par \par

## 2022-02-23 NOTE — PHYSICAL EXAM
[Soft] : abdomen soft [Non Tender] : non-tender [Non-distended] : non-distended [Normal Posterior Cervical Nodes] : no posterior cervical lymphadenopathy [Normal Anterior Cervical Nodes] : no anterior cervical lymphadenopathy [No CVA Tenderness] : no CVA  tenderness [No Joint Swelling] : no joint swelling [No Rash] : no rash [de-identified] : mild pitting LE edema bilaterally [de-identified] : Mild tenderness to palpation to bilateral knees.  Limited range of motion due to pain.  Mild swelling in right knee [de-identified] : Unsteady gait

## 2022-02-23 NOTE — REVIEW OF SYSTEMS
[Fatigue] : fatigue [Shortness Of Breath] : shortness of breath [Joint Pain] : joint pain [Unsteady Walking] : ataxia [Negative] : Neurological

## 2022-02-24 ENCOUNTER — CLINICAL ADVICE (OUTPATIENT)
Age: 82
End: 2022-02-24

## 2022-02-24 LAB
25(OH)D3 SERPL-MCNC: 73.1 NG/ML
ALBUMIN SERPL ELPH-MCNC: 4.3 G/DL
ALP BLD-CCNC: 76 U/L
ALT SERPL-CCNC: 12 U/L
ANION GAP SERPL CALC-SCNC: 14 MMOL/L
AST SERPL-CCNC: 18 U/L
BASOPHILS # BLD AUTO: 0.03 K/UL
BASOPHILS NFR BLD AUTO: 0.4 %
BILIRUB DIRECT SERPL-MCNC: 0.2 MG/DL
BILIRUB INDIRECT SERPL-MCNC: 0.3 MG/DL
BILIRUB SERPL-MCNC: 0.5 MG/DL
BUN SERPL-MCNC: 10 MG/DL
CALCIUM SERPL-MCNC: 10.2 MG/DL
CHLORIDE SERPL-SCNC: 100 MMOL/L
CHOLEST SERPL-MCNC: 242 MG/DL
CO2 SERPL-SCNC: 26 MMOL/L
CREAT SERPL-MCNC: 0.81 MG/DL
EOSINOPHIL # BLD AUTO: 0.07 K/UL
EOSINOPHIL NFR BLD AUTO: 1 %
ESTIMATED AVERAGE GLUCOSE: 105 MG/DL
FERRITIN SERPL-MCNC: 125 NG/ML
FOLATE SERPL-MCNC: >20 NG/ML
GLUCOSE SERPL-MCNC: 98 MG/DL
HBA1C MFR BLD HPLC: 5.3 %
HCT VFR BLD CALC: 41.8 %
HDLC SERPL-MCNC: 122 MG/DL
HGB BLD-MCNC: 12.9 G/DL
IMM GRANULOCYTES NFR BLD AUTO: 0.1 %
IRON SATN MFR SERPL: 66 %
IRON SERPL-MCNC: 181 UG/DL
LDLC SERPL CALC-MCNC: 96 MG/DL
LYMPHOCYTES # BLD AUTO: 2.89 K/UL
LYMPHOCYTES NFR BLD AUTO: 40.8 %
MAN DIFF?: NORMAL
MCHC RBC-ENTMCNC: 24 PG
MCHC RBC-ENTMCNC: 30.9 GM/DL
MCV RBC AUTO: 77.8 FL
MONOCYTES # BLD AUTO: 0.42 K/UL
MONOCYTES NFR BLD AUTO: 5.9 %
NEUTROPHILS # BLD AUTO: 3.67 K/UL
NEUTROPHILS NFR BLD AUTO: 51.8 %
NONHDLC SERPL-MCNC: 120 MG/DL
PLATELET # BLD AUTO: 218 K/UL
POTASSIUM SERPL-SCNC: 4.4 MMOL/L
PROT SERPL-MCNC: 6.4 G/DL
RBC # BLD: 5.37 M/UL
RBC # FLD: 14.9 %
SODIUM SERPL-SCNC: 141 MMOL/L
TIBC SERPL-MCNC: 273 UG/DL
TRIGL SERPL-MCNC: 120 MG/DL
TSH SERPL-ACNC: 2.4 UIU/ML
UIBC SERPL-MCNC: 92 UG/DL
VIT B12 SERPL-MCNC: >2000 PG/ML
WBC # FLD AUTO: 7.09 K/UL

## 2022-04-11 ENCOUNTER — RX RENEWAL (OUTPATIENT)
Age: 82
End: 2022-04-11

## 2022-04-11 PROBLEM — Z11.59 SCREENING FOR VIRAL DISEASE: Status: ACTIVE | Noted: 2020-07-01

## 2022-04-11 RX ORDER — ALBUTEROL SULFATE 90 UG/1
108 (90 BASE) INHALANT RESPIRATORY (INHALATION)
Qty: 25.5 | Refills: 3 | Status: ACTIVE | COMMUNITY
Start: 2020-05-13 | End: 1900-01-01

## 2022-05-03 ENCOUNTER — RX RENEWAL (OUTPATIENT)
Age: 82
End: 2022-05-03

## 2022-07-11 ENCOUNTER — RX RENEWAL (OUTPATIENT)
Age: 82
End: 2022-07-11

## 2022-12-19 ENCOUNTER — RX RENEWAL (OUTPATIENT)
Age: 82
End: 2022-12-19

## 2023-01-23 RX ORDER — ALBUTEROL SULFATE 90 UG/1
108 (90 BASE) AEROSOL, METERED RESPIRATORY (INHALATION)
Qty: 3 | Refills: 0 | Status: ACTIVE | COMMUNITY
Start: 2020-02-13 | End: 1900-01-01

## 2023-01-23 RX ORDER — IPRATROPIUM BROMIDE 17 UG/1
17 AEROSOL, METERED RESPIRATORY (INHALATION)
Qty: 38.7 | Refills: 1 | Status: ACTIVE | COMMUNITY
Start: 2020-11-24 | End: 1900-01-01

## 2023-05-22 NOTE — PATIENT PROFILE ADULT. - COMFORT LEVEL, ACCEPTABLE
Oculoplastic Surgeon Procedure Text (A): After obtaining clear surgical margins the patient was sent to oculoplastics for surgical repair.  The patient understands they will receive post-surgical care and follow-up from the referring physician's office. 4

## 2023-08-08 ENCOUNTER — APPOINTMENT (OUTPATIENT)
Dept: INTERNAL MEDICINE | Facility: CLINIC | Age: 83
End: 2023-08-08

## 2023-08-08 ENCOUNTER — RX RENEWAL (OUTPATIENT)
Age: 83
End: 2023-08-08

## 2024-11-18 DIAGNOSIS — Z13.83 ENCOUNTER FOR SCREENING FOR RESPIRATORY DISORDER NEC: ICD-10-CM

## 2024-12-19 ENCOUNTER — OUTPATIENT (OUTPATIENT)
Dept: OUTPATIENT SERVICES | Facility: HOSPITAL | Age: 84
LOS: 1 days | End: 2024-12-19
Payer: MEDICARE

## 2024-12-19 ENCOUNTER — APPOINTMENT (OUTPATIENT)
Dept: RADIOLOGY | Facility: IMAGING CENTER | Age: 84
End: 2024-12-19
Payer: MEDICARE

## 2024-12-19 DIAGNOSIS — Z13.83 ENCOUNTER FOR SCREENING FOR RESPIRATORY DISORDER NEC: ICD-10-CM

## 2024-12-19 PROCEDURE — 71046 X-RAY EXAM CHEST 2 VIEWS: CPT

## 2024-12-19 PROCEDURE — 71046 X-RAY EXAM CHEST 2 VIEWS: CPT | Mod: 26

## 2025-01-03 ENCOUNTER — APPOINTMENT (OUTPATIENT)
Dept: PULMONOLOGY | Facility: CLINIC | Age: 85
End: 2025-01-03
Payer: MEDICARE

## 2025-01-03 VITALS
HEIGHT: 63 IN | OXYGEN SATURATION: 95 % | HEART RATE: 94 BPM | SYSTOLIC BLOOD PRESSURE: 149 MMHG | WEIGHT: 162.4 LBS | BODY MASS INDEX: 28.77 KG/M2 | DIASTOLIC BLOOD PRESSURE: 77 MMHG

## 2025-01-03 DIAGNOSIS — J98.09 OTHER DISEASES OF BRONCHUS, NOT ELSEWHERE CLASSIFIED: ICD-10-CM

## 2025-01-03 PROCEDURE — 94729 DIFFUSING CAPACITY: CPT

## 2025-01-03 PROCEDURE — 99203 OFFICE O/P NEW LOW 30 MIN: CPT | Mod: 25

## 2025-01-03 PROCEDURE — 94726 PLETHYSMOGRAPHY LUNG VOLUMES: CPT

## 2025-01-03 PROCEDURE — ZZZZZ: CPT

## 2025-01-03 PROCEDURE — 94060 EVALUATION OF WHEEZING: CPT

## 2025-01-03 RX ORDER — IPRATROPIUM BROMIDE AND ALBUTEROL SULFATE 2.5; .5 MG/3ML; MG/3ML
0.5-2.5 (3) SOLUTION RESPIRATORY (INHALATION) 4 TIMES DAILY
Qty: 2 | Refills: 11 | Status: ACTIVE | COMMUNITY
Start: 2025-01-03 | End: 1900-01-01

## 2025-01-06 RX ORDER — BUDESONIDE AND FORMOTEROL FUMARATE DIHYDRATE 160; 4.5 UG/1; UG/1
160-4.5 AEROSOL RESPIRATORY (INHALATION)
Qty: 3 | Refills: 4 | Status: ACTIVE | COMMUNITY
Start: 2025-01-03 | End: 1900-01-01

## 2025-06-03 ENCOUNTER — APPOINTMENT (OUTPATIENT)
Dept: ORTHOPEDIC SURGERY | Facility: CLINIC | Age: 85
End: 2025-06-03
Payer: MEDICARE

## 2025-06-03 DIAGNOSIS — M17.11 UNILATERAL PRIMARY OSTEOARTHRITIS, RIGHT KNEE: ICD-10-CM

## 2025-06-03 PROCEDURE — 73564 X-RAY EXAM KNEE 4 OR MORE: CPT | Mod: RT

## 2025-06-03 PROCEDURE — 99203 OFFICE O/P NEW LOW 30 MIN: CPT | Mod: 25

## 2025-06-03 PROCEDURE — 20610 DRAIN/INJ JOINT/BURSA W/O US: CPT | Mod: RT

## 2025-08-05 ENCOUNTER — APPOINTMENT (OUTPATIENT)
Dept: ORTHOPEDIC SURGERY | Facility: CLINIC | Age: 85
End: 2025-08-05